# Patient Record
Sex: MALE | Race: WHITE | NOT HISPANIC OR LATINO | Employment: FULL TIME | URBAN - METROPOLITAN AREA
[De-identification: names, ages, dates, MRNs, and addresses within clinical notes are randomized per-mention and may not be internally consistent; named-entity substitution may affect disease eponyms.]

---

## 2018-06-27 ENCOUNTER — HOSPITAL ENCOUNTER (EMERGENCY)
Facility: MEDICAL CENTER | Age: 53
End: 2018-06-28
Attending: EMERGENCY MEDICINE
Payer: COMMERCIAL

## 2018-06-27 DIAGNOSIS — T14.8XXA BRUISE: ICD-10-CM

## 2018-06-27 PROCEDURE — 99283 EMERGENCY DEPT VISIT LOW MDM: CPT

## 2018-06-28 VITALS
SYSTOLIC BLOOD PRESSURE: 115 MMHG | BODY MASS INDEX: 42.65 KG/M2 | HEIGHT: 71 IN | OXYGEN SATURATION: 93 % | HEART RATE: 89 BPM | TEMPERATURE: 98.1 F | RESPIRATION RATE: 18 BRPM | WEIGHT: 304.68 LBS | DIASTOLIC BLOOD PRESSURE: 67 MMHG

## 2018-06-28 ASSESSMENT — PAIN SCALES - GENERAL: PAINLEVEL_OUTOF10: 0

## 2018-06-28 NOTE — ED NOTES
Chief Complaint   Patient presents with   • Bug Bite     pt states he looked at his under arm yesterday morning and noticed a bruise that looks like a previous spider bite he has had

## 2018-06-28 NOTE — ED NOTES
ERP was in to see pt. Pt states he is ready to go home. Patient provided printed discharge instructions which included signs and symptoms to look out for, why to return to ER, and other follow up appointments to make. Patient stated they understand discharge instructions and had no further questions or concerns at this time. Patient discharged to home by self. Patient ambulated out of ER with stable gait.

## 2018-06-28 NOTE — ED PROVIDER NOTES
ED Provider Note    CHIEF COMPLAINT  Chief Complaint   Patient presents with   • Bug Bite     pt states he looked at his under arm yesterday morning and noticed a bruise that looks like a previous spider bite he has had in past years        HPI    Primary care provider: Mikala Saba M.D.   History obtained from: Patient  History limited by: None     Pietro Stauffer is a 52 y.o. male who presents to the ED complaining of possible spider bite to his right upper arm.  Patient states that he noticed an area of swelling on the right upper arm yesterday morning and states that the symptoms appear similar to when he had a spider bite previously.  Patient states that he felt warm yesterday but did not check his temperature.  He also reports that he did not want to come to the ED tonight but his mom made him do it.  Patient states that he otherwise feels fine.  He did not actually see a spider bite him.  He denies rash anywhere else or any other symptoms.  He denies any known injury or trauma.    REVIEW OF SYSTEMS  Please see HPI for pertinent positives/negatives.     PAST MEDICAL HISTORY  Past Medical History:   Diagnosis Date   • Arthritis    • Hypothyroidism     total thyroidectomy   • Psychiatric disorder     depression        SURGICAL HISTORY  Past Surgical History:   Procedure Laterality Date   • BUNIONECTOMY  10/31/2011    Performed by YARY STONE at SURGERY [facility]   • TENDON REPAIR  10/31/2011    Performed by Y[facility] STONE at [facility]   • THYROIDECTOMY TOTAL  2007   • APPENDECTOMY  1998   • OTHER ORTHOPEDIC SURGERY  1985    ganglion cyst excision, bilateral x2 left x1 right        SOCIAL HISTORY  Social History     Social History Main Topics   • Smoking status: Never Smoker   • Smokeless tobacco: Never Used      Comment: chew   • Alcohol use Yes      Comment: 1-2 per day   • Drug use: No   • Sexual activity: Not on file        FAMILY HISTORY  Family History   Problem Relation Age  "of Onset   • Heart Disease     • Hypertension     • Cancer     • Diabetes Father         CURRENT MEDICATIONS  Home Medications     Reviewed by Hue Garrett R.N. (Registered Nurse) on 06/28/18 at 0003  Med List Status: <None>   Medication Last Dose Status   azithromycin (ZITHROMAX) 250 MG TABS  Active   azithromycin (ZITHROMAX) 250 MG TABS  Active   fluticasone (FLONASE) 50 MCG/ACT nasal spray  Active   guaifenesin-codeine (CHERATUSSIN AC) SOLN  Active   Hydrocod Polst-Chlorphen Polst 10-8 MG/5ML LQCR  Active   levothyroxine (SYNTHROID) 125 MCG TABS Taking Active                 ALLERGIES  Allergies   Allergen Reactions   • Nkda [No Known Drug Allergy]         PHYSICAL EXAM  VITAL SIGNS: /67   Pulse 89   Temp 36.7 °C (98.1 °F)   Resp 18   Ht 1.803 m (5' 11\")   Wt (!) 138.2 kg (304 lb 10.8 oz)   SpO2 93%   BMI 42.49 kg/m²  @JANIS[[id number]::@     Pulse ox interpretation: 93% I interpret this pulse ox as normal     Constitutional: Well developed, well nourished, alert in no apparent distress, nontoxic appearance    HENT: No external signs of trauma    Eyes: PERRL, conjunctiva without erythema, no discharge, no icterus     Cardiovascular: Srong distal pulses and good perfusion    Thorax & Lungs: No respiratory distress  Extremities: No cyanosis, no edema, no gross deformity, good ROM, no tenderness, oval shaped area of ecchymosis on posterior portion of right upper arm distally without apparent tenderness to palpation or warmth/crepitus/fluctuance/streaking/drainage/blisters/vesicles/ulceration, intact distal pulses with brisk cap refill    Skin: Warm, dry, no pallor/cyanosis, no rash noted except as above  Lymphatic: No lymphadenopathy noted    Neuro: A/O times 3, no focal deficits noted    Psychiatric: Cooperative            DIAGNOSTIC STUDIES / PROCEDURES        LABS  All labs reviewed by me.     Results for orders placed or performed in visit on 12/22/12   POCT Influenza A/B   Result Value Ref Range    " Rapid Influenza A-B neg         RADIOLOGY  The radiologist's interpretation of all radiological studies have been reviewed by me.     No orders to display          COURSE & MEDICAL DECISION MAKING  Nursing notes, VS, PMSFHx reviewed in chart.       Differential diagnoses considered include but are not limited to: Cellulitis, insect bite/sting, bruise/hematoma       Patient presents to ED with above complaint.  He is noted to have what appears to be a bruise on the right upper arm.  It does not appear to be infected and I do not see any evidence of a bite.  Discussed with patient option of antibiotic for cellulitis which he declined.  I discussed with him home treatment for his bruise and worrisome signs and symptoms and return to ED precautions and he was advised on outpatient follow-up.  He is otherwise noted to be in no acute distress and nontoxic in appearance.  Patient verbalized understanding and agreed with plan of care with no further questions or concerns.      The patient is referred to a primary physician for blood pressure management, diabetic screening, and for all other preventative health concerns.       FINAL IMPRESSION  1. Bruise           DISPOSITION  Patient will be discharged home in stable condition.       FOLLOW UP  Mikala Saba M.D.  [address] [place] [id number] [phone]    Call today      [facility], Emergency Dept  [address]  [place] [address] [phone]    If symptoms worsen         OUTPATIENT MEDICATIONS  New Prescriptions    No medications on file          Electronically signed by: Raul Trevino, 6/27/2018 11:37 PM      Portions of this record were made with voice recognition software.  Despite my review, spelling/grammar/context errors may still remain.  Interpretation of this chart should be taken in this context.

## 2020-12-09 ENCOUNTER — OFFICE VISIT (OUTPATIENT)
Dept: MEDICAL GROUP | Facility: LAB | Age: 55
End: 2020-12-09
Payer: COMMERCIAL

## 2020-12-09 VITALS
DIASTOLIC BLOOD PRESSURE: 104 MMHG | BODY MASS INDEX: 42.66 KG/M2 | SYSTOLIC BLOOD PRESSURE: 152 MMHG | TEMPERATURE: 98.8 F | HEIGHT: 72 IN | WEIGHT: 315 LBS | RESPIRATION RATE: 18 BRPM | HEART RATE: 74 BPM | OXYGEN SATURATION: 95 %

## 2020-12-09 DIAGNOSIS — Z12.11 SCREENING FOR COLORECTAL CANCER: ICD-10-CM

## 2020-12-09 DIAGNOSIS — Z12.12 SCREENING FOR COLORECTAL CANCER: ICD-10-CM

## 2020-12-09 DIAGNOSIS — E03.9 HYPOTHYROIDISM (ACQUIRED): ICD-10-CM

## 2020-12-09 DIAGNOSIS — M25.561 CHRONIC PAIN OF RIGHT KNEE: ICD-10-CM

## 2020-12-09 DIAGNOSIS — I10 ESSENTIAL HYPERTENSION: Primary | ICD-10-CM

## 2020-12-09 DIAGNOSIS — Z12.5 SCREENING PSA (PROSTATE SPECIFIC ANTIGEN): ICD-10-CM

## 2020-12-09 DIAGNOSIS — G89.29 CHRONIC PAIN OF RIGHT KNEE: ICD-10-CM

## 2020-12-09 PROCEDURE — 99203 OFFICE O/P NEW LOW 30 MIN: CPT | Performed by: PHYSICIAN ASSISTANT

## 2020-12-09 RX ORDER — LOSARTAN POTASSIUM 100 MG/1
100 TABLET ORAL DAILY
Qty: 90 TAB | Refills: 0 | Status: SHIPPED | OUTPATIENT
Start: 2020-12-09 | End: 2021-03-02

## 2020-12-09 RX ORDER — LOSARTAN POTASSIUM 100 MG/1
100 TABLET ORAL DAILY
Qty: 90 TAB | Refills: 0 | Status: SHIPPED | OUTPATIENT
Start: 2020-12-09 | End: 2020-12-09 | Stop reason: SDUPTHER

## 2020-12-09 ASSESSMENT — ENCOUNTER SYMPTOMS
NEUROLOGICAL NEGATIVE: 1
GASTROINTESTINAL NEGATIVE: 1
CHILLS: 0
FEVER: 0
EYES NEGATIVE: 1
WEIGHT LOSS: 0
PSYCHIATRIC NEGATIVE: 1
CARDIOVASCULAR NEGATIVE: 1
DIAPHORESIS: 0
SORE THROAT: 0
RESPIRATORY NEGATIVE: 1

## 2020-12-09 ASSESSMENT — PATIENT HEALTH QUESTIONNAIRE - PHQ9: CLINICAL INTERPRETATION OF PHQ2 SCORE: 0

## 2020-12-09 NOTE — ASSESSMENT & PLAN NOTE
New to me; chronic  Is under the car of [employer].   To have right knee scope in near future - though needed PCP and clearance prior to having surgery scheduled.

## 2020-12-09 NOTE — ASSESSMENT & PLAN NOTE
New to me; chronic; history of thyroidectomy.   Using Levothyroxine 125mcg.   Reports increase in weight and fatigue.   Would like levels checked.

## 2020-12-09 NOTE — ASSESSMENT & PLAN NOTE
New to me today.   Pt denies previous history of elevated BP, though he has not had consistent medical care for a while.   No chest pain or sob  Denies headache  No vision changes.     Does consume increased amounts of sodium and caffeine throughout the day.   Consumes about 2-3 beers/day as well.

## 2020-12-09 NOTE — PROGRESS NOTES
Pietro Stauffer is a 55 y.o. male new patient here for chronic care management.   HPI:    Hypothyroidism (acquired)  New to me; chronic; history of thyroidectomy.   Using Levothyroxine 125mcg.   Reports increase in weight and fatigue.   Would like levels checked.       Chronic pain of right knee  New to me; chronic  Is under the car of [employer].   To have right knee scope in near future - though needed PCP and clearance prior to having surgery scheduled.     Essential hypertension  New to me today.   Pt denies previous history of elevated BP, though he has not had consistent medical care for a while.   No chest pain or sob  Denies headache  No vision changes.     Does consume increased amounts of sodium and caffeine throughout the day.   Consumes about 2-3 beers/day as well.     Current medicines (including changes today)  Current Outpatient Medications   Medication Sig Dispense Refill   • Naproxen Sodium (ALEVE PO) Take  by mouth.     • losartan (COZAAR) 100 MG Tab Take 1 Tab by mouth every day. 90 Tab 0   • levothyroxine (SYNTHROID) 125 MCG TABS Take 125 mcg by mouth 2 Times a Day. Take two tablets daily, on Monday uses 3 tablets       No current facility-administered medications for this visit.      He  has a past medical history of Arthritis, Hypothyroidism, and Psychiatric disorder.  He  has a past surgical history that includes other orthopedic surgery (1985); appendectomy (1998); thyroidectomy total (2007); bunionectomy (10/31/2011); and tendon repair (10/31/2011).  Social History     Tobacco Use   • Smoking status: Never Smoker   • Smokeless tobacco: Never Used   • Tobacco comment: chew   Substance Use Topics   • Alcohol use: Yes     Comment: 1-2 per day   • Drug use: No     Social History     Social History Narrative   • Not on file     Family History   Problem Relation Age of Onset   • Heart Disease Other    • Hypertension Other    • Cancer Other    • Diabetes Father      Family Status   Relation Name Status   •  "OTHER  (Not Specified)   • OTHER  (Not Specified)   • OTHER  (Not Specified)   • Fa  (Not Specified)         ROS  Review of Systems   Constitutional: Positive for malaise/fatigue. Negative for chills, diaphoresis, fever and weight loss.   HENT: Negative for congestion, ear pain and sore throat.    Eyes: Negative.    Respiratory: Negative.    Cardiovascular: Negative.    Gastrointestinal: Negative.    Genitourinary: Negative.         +nocturia    Musculoskeletal: Positive for joint pain.   Skin: Negative.    Neurological: Negative.    Endo/Heme/Allergies: Negative for environmental allergies.   Psychiatric/Behavioral: Negative.         Objective:     /104 (BP Location: Left arm, Patient Position: Sitting, BP Cuff Size: Large adult)   Pulse 74   Temp 37.1 °C (98.8 °F) (Temporal)   Resp 18   Ht 1.816 m (5' 11.5\")   Wt (!) 146.1 kg (322 lb)   SpO2 95%  Body mass index is 44.28 kg/m².  Physical Exam:    Constitutional: Alert, no distress.  Skin: Warm, dry, good turgor, no rashes in visible areas, small firm nodule on the top of head to the right side. Non tender in this area.   Eye: Equal, round and reactive, conjunctiva clear, lids normal.  Neck: Trachea midline  Respiratory: Unlabored respiratory effort, lungs clear to auscultation, no wheezes, no ronchi.  Cardiovascular: Normal S1, S2, no murmur, no edema.  Psych: Alert and oriented x3, normal affect and mood.      Assessment and Plan:   The following treatment plan was discussed    1. Chronic pain of right knee  New to me; managed by [employer]  To have surgery.   Will need to have BP better controlled prior to. Did have clearance with [employer], though they are requesting clearance from PCP.     2. Essential hypertension  New to me;   Will start Losartan as written.   Pt was educated on use and SEs of medication.  Will see pt back in 1 week.   Discuss reduction in sodium content in diet, reduce caffeine and alcohol.   UC/ED precautions discussed.   - losartan " (COZAAR) 100 MG Tab; Take 1 Tab by mouth every day.  Dispense: 90 Tab; Refill: 0    3. Hypothyroidism (acquired)  New to me; chronic; due for labs.  - TSH; Future  - FREE THYROXINE; Future  - T3 FREE; Future    4. BMI 40.0-44.9, adult (HCC)  BMI was assessed today and reviewed with patient as meeting criteria for the risk factor obesity.  Willing to change as well as barriers were assessed. A collaborative plan was made with the patient and goals were set. Assistance was discussed, including self-help and more formal medical adjunctive treatments.   Wt Readings from Last 3 Encounters:   12/09/20 (!) 146.1 kg (322 lb)   06/27/18 (!) 138.2 kg (304 lb 10.8 oz)   12/22/12 122.5 kg (270 lb)     - CBC WITH DIFFERENTIAL; Future  - Comp Metabolic Panel; Future  - Lipid Profile; Future  - HEMOGLOBIN A1C; Future  - VITAMIN D,25 HYDROXY; Future    5. Screening PSA (prostate specific antigen)  - PROSTATE SPECIFIC AG DIAGNOSTIC; Future    6. Screening for colorectal cancer  - COLOGUARD (FIT DNA)    *Continue to monitor scalp bump 2/2 hitting head recently.     Records requested.  Followup: Return in about 1 week (around 12/16/2020).       Theresa Lucas P.A.-C.  Supervising MD: Dr. Shakila Flores MD  12/09/20

## 2020-12-09 NOTE — LETTER
[facility]  Theresa Lucas P.A.-C.  [address]  [place] [address]  Fax: [phone]   Authorization for Release/Disclosure of   Protected Health Information   Name: PIETRO GOLDEN : 1965 SSN: [id number]   Address: [address] [place] [address]  [place] [address] Phone:    [phone] (home)    I authorize the entity listed below to release/disclose the PHI below to:   [facility]/Theresa Lucas P.A.-C. and Theresa Lucas P.A.-C.   Provider or Entity Name:  Dr. Ramin Giordano MD/[place]   Address   [facility]  [address] [place], [place]  Phone:      Fax:     Reason for request: continuity of care   Information to be released:    [  ] LAST COLONOSCOPY,  including any PATH REPORT and follow-up  [  ] LAST FIT/COLOGUARD RESULT [  ] LAST DEXA  [  ] LAST MAMMOGRAM  [  ] LAST PAP  [  ] LAST LABS [  ] RETINA EXAM REPORT  [  ] IMMUNIZATION RECORDS  [  ] Release all info      [  ] Check here and initial the line next to each item to release ALL health information INCLUDING  _____ Care and treatment for drug and / or alcohol abuse  _____ HIV testing, infection status, or AIDS  _____ Genetic Testing    DATES OF SERVICE OR TIME PERIOD TO BE DISCLOSED: _____________  I understand and acknowledge that:  * This Authorization may be revoked at any time by you in writing, except if your health information has already been used or disclosed.  * Your health information that will be used or disclosed as a result of you signing this authorization could be re-disclosed by the recipient. If this occurs, your re-disclosed health information may no longer be protected by State or Federal laws.  * You may refuse to sign this Authorization. Your refusal will not affect your ability to obtain treatment.  * This Authorization becomes effective upon signing and will  on (date) __________.      If no date is indicated, this Authorization will  one (1) year from the signature date.    Name: Pietro  Eh    Signature:   Date:     12/9/2020       PLEASE FAX REQUESTED RECORDS BACK TO: [phone]

## 2020-12-09 NOTE — LETTER
[facility]  Theresa Lucas P.A.-C.  [address]  [place] [address]  Fax: [phone]   Authorization for Release/Disclosure of   Protected Health Information   Name: PIETRO STAUFFER : 1965 SSN: [id number]   Address: [address] [place] [address]  [place] [address] Phone:    [phone] (home)    I authorize the entity listed below to release/disclose the PHI below to:   [facility]/Theresa Lucas P.A.-C. and Theresa Lucas P.A.-C.   Provider or Entity Name:     Address   City, State, Zip   Phone:      Fax:     Reason for request: continuity of care   Information to be released:    [  ] LAST COLONOSCOPY,  including any PATH REPORT and follow-up  [  ] LAST FIT/COLOGUARD RESULT [  ] LAST DEXA  [  ] LAST MAMMOGRAM  [  ] LAST PAP  [  ] LAST LABS [  ] RETINA EXAM REPORT  [  ] IMMUNIZATION RECORDS  [  ] Release all info      [  ] Check here and initial the line next to each item to release ALL health information INCLUDING  _____ Care and treatment for drug and / or alcohol abuse  _____ HIV testing, infection status, or AIDS  _____ Genetic Testing    DATES OF SERVICE OR TIME PERIOD TO BE DISCLOSED: _____________  I understand and acknowledge that:  * This Authorization may be revoked at any time by you in writing, except if your health information has already been used or disclosed.  * Your health information that will be used or disclosed as a result of you signing this authorization could be re-disclosed by the recipient. If this occurs, your re-disclosed health information may no longer be protected by State or Federal laws.  * You may refuse to sign this Authorization. Your refusal will not affect your ability to obtain treatment.  * This Authorization becomes effective upon signing and will  on (date) __________.      If no date is indicated, this Authorization will  one (1) year from the signature date.    Name: Pietro Stauffer    Signature:   Date:     2020            PLEASE FAX REQUESTED RECORDS BACK TO: [phone]

## 2020-12-14 ENCOUNTER — HOSPITAL ENCOUNTER (OUTPATIENT)
Dept: LAB | Facility: MEDICAL CENTER | Age: 55
End: 2020-12-14
Attending: PHYSICIAN ASSISTANT
Payer: COMMERCIAL

## 2020-12-14 DIAGNOSIS — Z12.5 SCREENING PSA (PROSTATE SPECIFIC ANTIGEN): ICD-10-CM

## 2020-12-14 DIAGNOSIS — E03.9 HYPOTHYROIDISM (ACQUIRED): ICD-10-CM

## 2020-12-14 LAB
25(OH)D3 SERPL-MCNC: 15 NG/ML (ref 30–100)
ALBUMIN SERPL BCP-MCNC: 4.2 G/DL (ref 3.2–4.9)
ALBUMIN/GLOB SERPL: 1.4 G/DL
ALP SERPL-CCNC: 82 U/L (ref 30–99)
ALT SERPL-CCNC: 71 U/L (ref 2–50)
ANION GAP SERPL CALC-SCNC: 9 MMOL/L (ref 7–16)
AST SERPL-CCNC: 60 U/L (ref 12–45)
BASOPHILS # BLD AUTO: 0.8 % (ref 0–1.8)
BASOPHILS # BLD: 0.05 K/UL (ref 0–0.12)
BILIRUB SERPL-MCNC: 0.9 MG/DL (ref 0.1–1.5)
BUN SERPL-MCNC: 9 MG/DL (ref 8–22)
CALCIUM SERPL-MCNC: 9 MG/DL (ref 8.5–10.5)
CHLORIDE SERPL-SCNC: 101 MMOL/L (ref 96–112)
CHOLEST SERPL-MCNC: 192 MG/DL (ref 100–199)
CO2 SERPL-SCNC: 28 MMOL/L (ref 20–33)
CREAT SERPL-MCNC: 0.69 MG/DL (ref 0.5–1.4)
EOSINOPHIL # BLD AUTO: 0.15 K/UL (ref 0–0.51)
EOSINOPHIL NFR BLD: 2.3 % (ref 0–6.9)
ERYTHROCYTE [DISTWIDTH] IN BLOOD BY AUTOMATED COUNT: 46.7 FL (ref 35.9–50)
EST. AVERAGE GLUCOSE BLD GHB EST-MCNC: 120 MG/DL
FASTING STATUS PATIENT QL REPORTED: NORMAL
GLOBULIN SER CALC-MCNC: 2.9 G/DL (ref 1.9–3.5)
GLUCOSE SERPL-MCNC: 120 MG/DL (ref 65–99)
HBA1C MFR BLD: 5.8 % (ref 0–5.6)
HCT VFR BLD AUTO: 45.9 % (ref 42–52)
HDLC SERPL-MCNC: 54 MG/DL
HGB BLD-MCNC: 15.6 G/DL (ref 14–18)
IMM GRANULOCYTES # BLD AUTO: 0.03 K/UL (ref 0–0.11)
IMM GRANULOCYTES NFR BLD AUTO: 0.5 % (ref 0–0.9)
LDLC SERPL CALC-MCNC: 124 MG/DL
LYMPHOCYTES # BLD AUTO: 1.67 K/UL (ref 1–4.8)
LYMPHOCYTES NFR BLD: 25.3 % (ref 22–41)
MCH RBC QN AUTO: 32.9 PG (ref 27–33)
MCHC RBC AUTO-ENTMCNC: 34 G/DL (ref 33.7–35.3)
MCV RBC AUTO: 96.8 FL (ref 81.4–97.8)
MONOCYTES # BLD AUTO: 0.65 K/UL (ref 0–0.85)
MONOCYTES NFR BLD AUTO: 9.8 % (ref 0–13.4)
NEUTROPHILS # BLD AUTO: 4.05 K/UL (ref 1.82–7.42)
NEUTROPHILS NFR BLD: 61.3 % (ref 44–72)
NRBC # BLD AUTO: 0 K/UL
NRBC BLD-RTO: 0 /100 WBC
PLATELET # BLD AUTO: 176 K/UL (ref 164–446)
PMV BLD AUTO: 8.3 FL (ref 9–12.9)
POTASSIUM SERPL-SCNC: 4.1 MMOL/L (ref 3.6–5.5)
PROT SERPL-MCNC: 7.1 G/DL (ref 6–8.2)
PSA SERPL-MCNC: 0.68 NG/ML (ref 0–4)
RBC # BLD AUTO: 4.74 M/UL (ref 4.7–6.1)
SODIUM SERPL-SCNC: 138 MMOL/L (ref 135–145)
T3FREE SERPL-MCNC: 3.04 PG/ML (ref 2–4.4)
T4 FREE SERPL-MCNC: 1.82 NG/DL (ref 0.93–1.7)
TRIGL SERPL-MCNC: 68 MG/DL (ref 0–149)
TSH SERPL DL<=0.005 MIU/L-ACNC: 2.08 UIU/ML (ref 0.38–5.33)
WBC # BLD AUTO: 6.6 K/UL (ref 4.8–10.8)

## 2020-12-14 PROCEDURE — 80061 LIPID PANEL: CPT

## 2020-12-14 PROCEDURE — 85025 COMPLETE CBC W/AUTO DIFF WBC: CPT

## 2020-12-14 PROCEDURE — 84153 ASSAY OF PSA TOTAL: CPT

## 2020-12-14 PROCEDURE — 84439 ASSAY OF FREE THYROXINE: CPT

## 2020-12-14 PROCEDURE — 82306 VITAMIN D 25 HYDROXY: CPT

## 2020-12-14 PROCEDURE — 84481 FREE ASSAY (FT-3): CPT

## 2020-12-14 PROCEDURE — 84443 ASSAY THYROID STIM HORMONE: CPT

## 2020-12-14 PROCEDURE — 83036 HEMOGLOBIN GLYCOSYLATED A1C: CPT

## 2020-12-14 PROCEDURE — 80053 COMPREHEN METABOLIC PANEL: CPT

## 2020-12-14 PROCEDURE — 36415 COLL VENOUS BLD VENIPUNCTURE: CPT

## 2020-12-16 ENCOUNTER — OFFICE VISIT (OUTPATIENT)
Dept: MEDICAL GROUP | Facility: LAB | Age: 55
End: 2020-12-16
Payer: COMMERCIAL

## 2020-12-16 VITALS
RESPIRATION RATE: 20 BRPM | OXYGEN SATURATION: 94 % | SYSTOLIC BLOOD PRESSURE: 122 MMHG | HEART RATE: 91 BPM | BODY MASS INDEX: 42.66 KG/M2 | DIASTOLIC BLOOD PRESSURE: 78 MMHG | HEIGHT: 72 IN | WEIGHT: 315 LBS | TEMPERATURE: 98.8 F

## 2020-12-16 DIAGNOSIS — M25.561 CHRONIC PAIN OF RIGHT KNEE: ICD-10-CM

## 2020-12-16 DIAGNOSIS — G89.29 CHRONIC PAIN OF RIGHT KNEE: ICD-10-CM

## 2020-12-16 DIAGNOSIS — I10 ESSENTIAL HYPERTENSION: ICD-10-CM

## 2020-12-16 DIAGNOSIS — E55.9 VITAMIN D DEFICIENCY: ICD-10-CM

## 2020-12-16 DIAGNOSIS — E66.01 MORBID OBESITY WITH BMI OF 40.0-44.9, ADULT (HCC): Primary | ICD-10-CM

## 2020-12-16 DIAGNOSIS — R73.03 PREDIABETES: ICD-10-CM

## 2020-12-16 PROCEDURE — 99214 OFFICE O/P EST MOD 30 MIN: CPT | Performed by: PHYSICIAN ASSISTANT

## 2020-12-16 RX ORDER — ERGOCALCIFEROL 1.25 MG/1
50000 CAPSULE ORAL
Qty: 12 CAP | Refills: 0 | Status: SHIPPED | OUTPATIENT
Start: 2020-12-16 | End: 2021-03-09

## 2020-12-16 ASSESSMENT — FIBROSIS 4 INDEX: FIB4 SCORE: 2.18

## 2020-12-16 NOTE — ASSESSMENT & PLAN NOTE
Follow up  Pt was started on Losartan 100mg PO once daily since last visit.   Pt is doing well.   BP is well controlled at this time.   No adverse side effects.

## 2020-12-16 NOTE — ASSESSMENT & PLAN NOTE
New on recent labs.   Lab Results   Component Value Date/Time    HBA1C 5.8 (H) 12/14/2020 11:40 AM      Working on diet and increasing exercise as tolerated.

## 2020-12-16 NOTE — Clinical Note
Can we fax this to Dr. Giordano from [facility].  Thank yoU!  Theresa Lucas P.A.-C.

## 2020-12-16 NOTE — ASSESSMENT & PLAN NOTE
Follow up; pt is currently seeing Dr. Giordano from [facility].   To have right knee scope in near future.   Surgery has not yet been scheduled, TBD.     Pre-op clearance has been completed by [facility].   BP is well controlled at this time with Losartan.     Pt is able to climb 2 flights of stairs (4METS)  Denies chest pain, sob  No personal or family history of complications of anesthesia.

## 2020-12-16 NOTE — PROGRESS NOTES
"Subjective:   Pietro Stauffer is a 55 y.o. male here today for Lab follow up; BP check    Chronic pain of right knee  Follow up; pt is currently seeing Dr. Giordano from [facility].   To have right knee scope in near future.   Surgery has not yet been scheduled, TBD.     Pre-op clearance has been completed by [facility].   BP is well controlled at this time with Losartan.     Pt is able to climb 2 flights of stairs (4METS)  Denies chest pain, sob  No personal or family history of complications of anesthesia.       Essential hypertension  Follow up  Pt was started on Losartan 100mg PO once daily since last visit.   Pt is doing well.   BP is well controlled at this time.   No adverse side effects.     Prediabetes  New on recent labs.   Lab Results   Component Value Date/Time    HBA1C 5.8 (H) 12/14/2020 11:40 AM      Working on diet and increasing exercise as tolerated.          Current medicines (including changes today)  Current Outpatient Medications   Medication Sig Dispense Refill   • ergocalciferol (DRISDOL) 75913 UNIT capsule Take 1 Cap by mouth every 7 days. 12 Cap 0   • Naproxen Sodium (ALEVE PO) Take  by mouth.     • losartan (COZAAR) 100 MG Tab Take 1 Tab by mouth every day. 90 Tab 0   • levothyroxine (SYNTHROID) 125 MCG TABS Take 125 mcg by mouth 2 Times a Day. Take two tablets daily, on Monday uses 3 tablets       No current facility-administered medications for this visit.      He  has a past medical history of Arthritis, Hypertension, Hypothyroidism, and Psychiatric disorder.    ROS No chest pain, no shortness of breath, no abdominal pain       Objective:     /78 (BP Location: Left arm, Patient Position: Sitting, BP Cuff Size: Large adult)   Pulse 91   Temp 37.1 °C (98.8 °F) (Temporal)   Resp 20   Ht 1.816 m (5' 11.5\")   Wt (!) 146.1 kg (322 lb)   SpO2 94%  Body mass index is 44.28 kg/m².   Physical Exam:  Constitutional: Alert, no distress.  Skin: Warm, dry, good turgor, no rashes in visible areas.  Eye: " Equal, round; conjunctiva clear, lids normal.  Neck: Trachea midline  Respiratory: Unlabored respiratory effort, lungs clear to auscultation, no wheezes, no ronchi.  Cardiovascular: Normal S1, S2, no murmur, no edema.  Psych: Alert and oriented x3, normal affect and mood.        Assessment and Plan:   The following treatment plan was discussed    1. Vitamin D deficiency  New on recent labs.   Rx for high dose Vitamin D.   Pt was educated on use and SEs of medication.  Continue to monitor.   - ergocalciferol (DRISDOL) 79068 UNIT capsule; Take 1 Cap by mouth every 7 days.  Dispense: 12 Cap; Refill: 0    2. Morbid obesity with BMI of 40.0-44.9, adult (HCC)  BMI was assessed today and reviewed with patient as meeting criteria for the risk factor obesity.  Willing to change as well as barriers were assessed. A collaborative plan was made with the patient and goals were set. Assistance was discussed, including self-help and more formal medical adjunctive treatments.     Wt Readings from Last 3 Encounters:   12/16/20 (!) 146.1 kg (322 lb)   12/09/20 (!) 146.1 kg (322 lb)   06/27/18 (!) 138.2 kg (304 lb 10.8 oz)     - Patient identified as having weight management issue.  Appropriate orders and counseling given.  - REFERRAL TO [facility] IMPROVEMENT [facility]GRAMS (HIP); Future    3. Chronic pain of right knee  Given that BP is now well controlled and has established with PCP - cleared for surgery.   To schedule right knee arthroscopy soon.   Pre-op work-up has been completed by providers at [facility].   As primary care stand point, pt is cleared for surgery.  Per Revised Cardiac Risk Index: Pt is considered low risk for pulmonary and cardiovascular complications for a low risk procedure.   To take medications as prescribed. To hold NSAIDS/ASA 7 days prior to surgery.   Chronic conditions are well controlled at this time.     4. Essential hypertension   Follow up; chronic  Doing well on current regimen.     5. Prediabetes:  We  advise to reduce sugar/carbohydrate/alcohol, eat more vegetables and lean meats such as fish/chicken/turkey. We also recommend 30 minutes of cardiovascular exercise most days of the week as tolerated.       Patient was seen for 25 minutes face to face of which > 50% of appointment time was spent on counseling and coordination of care regarding the above.    Followup: Return in about 6 months (around 6/16/2021), or if symptoms worsen or fail to improve.       HENRY Bowles.-JORDI.  Supervising MD: Dr. Shakila Flores MD  12/16/20

## 2021-01-15 ENCOUNTER — HOSPITAL ENCOUNTER (OUTPATIENT)
Dept: LAB | Facility: MEDICAL CENTER | Age: 56
End: 2021-01-15
Attending: ANESTHESIOLOGY
Payer: COMMERCIAL

## 2021-01-15 LAB — COVID ORDER STATUS COVID19: NORMAL

## 2021-01-15 PROCEDURE — C9803 HOPD COVID-19 SPEC COLLECT: HCPCS

## 2021-01-15 PROCEDURE — U0003 INFECTIOUS AGENT DETECTION BY NUCLEIC ACID (DNA OR RNA); SEVERE ACUTE RESPIRATORY SYNDROME CORONAVIRUS 2 (SARS-COV-2) (CORONAVIRUS DISEASE [COVID-19]), AMPLIFIED PROBE TECHNIQUE, MAKING USE OF HIGH THROUGHPUT TECHNOLOGIES AS DESCRIBED BY CMS-2020-01-R: HCPCS

## 2021-01-15 PROCEDURE — U0005 INFEC AGEN DETEC AMPLI PROBE: HCPCS

## 2021-01-16 LAB
SARS-COV-2 RNA RESP QL NAA+PROBE: NOTDETECTED
SPECIMEN SOURCE: NORMAL

## 2021-03-02 DIAGNOSIS — I10 ESSENTIAL HYPERTENSION: ICD-10-CM

## 2021-03-02 RX ORDER — LOSARTAN POTASSIUM 100 MG/1
TABLET ORAL
Qty: 90 TABLET | Refills: 0 | Status: SHIPPED | OUTPATIENT
Start: 2021-03-02 | End: 2021-08-05 | Stop reason: SDUPTHER

## 2021-03-02 NOTE — TELEPHONE ENCOUNTER
Received request via: Pharmacy    Was the patient seen in the last year in this department? Yes  12/16/2020  Does the patient have an active prescription (recently filled or refills available) for medication(s) requested? No

## 2021-03-09 ENCOUNTER — OFFICE VISIT (OUTPATIENT)
Dept: URGENT CARE | Facility: CLINIC | Age: 56
End: 2021-03-09
Payer: COMMERCIAL

## 2021-03-09 ENCOUNTER — APPOINTMENT (OUTPATIENT)
Dept: RADIOLOGY | Facility: IMAGING CENTER | Age: 56
End: 2021-03-09
Attending: NURSE PRACTITIONER
Payer: COMMERCIAL

## 2021-03-09 VITALS
TEMPERATURE: 97.6 F | HEIGHT: 72 IN | SYSTOLIC BLOOD PRESSURE: 144 MMHG | OXYGEN SATURATION: 92 % | DIASTOLIC BLOOD PRESSURE: 90 MMHG | BODY MASS INDEX: 42.66 KG/M2 | HEART RATE: 86 BPM | WEIGHT: 315 LBS | RESPIRATION RATE: 20 BRPM

## 2021-03-09 DIAGNOSIS — M79.644 THUMB PAIN, RIGHT: ICD-10-CM
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
## 2021-03-09 DIAGNOSIS — M25.531 RIGHT WRIST PAIN: ICD-10-CM

## 2021-03-09 DIAGNOSIS — S62.114A CLOSED NONDISPLACED FRACTURE OF TRIQUETRUM OF RIGHT WRIST, INITIAL ENCOUNTER: ICD-10-CM

## 2021-03-09 DIAGNOSIS — S63.601A SPRAIN OF RIGHT THUMB, UNSPECIFIED SITE OF DIGIT, INITIAL ENCOUNTER: ICD-10-CM

## 2021-03-09 DIAGNOSIS — S63.501A WRIST SPRAIN, RIGHT, INITIAL ENCOUNTER: ICD-10-CM

## 2021-03-09 PROCEDURE — 99214 OFFICE O/P EST MOD 30 MIN: CPT | Performed by: NURSE PRACTITIONER

## 2021-03-09 PROCEDURE — 73110 X-RAY EXAM OF WRIST: CPT | Mod: TC,FY,RT | Performed by: NURSE PRACTITIONER

## 2021-03-09 PROCEDURE — 73140 X-RAY EXAM OF FINGER(S): CPT | Mod: TC,FY,RT | Performed by: NURSE PRACTITIONER

## 2021-03-09 ASSESSMENT — ENCOUNTER SYMPTOMS
DIZZINESS: 0
FEVER: 0
CHILLS: 0
LOSS OF CONSCIOUSNESS: 0
WEAKNESS: 1
FALLS: 1
PALPITATIONS: 0
BACK PAIN: 0
COUGH: 0
ORTHOPNEA: 0
HEADACHES: 0

## 2021-03-09 ASSESSMENT — PAIN SCALES - GENERAL: PAINLEVEL: 10=SEVERE PAIN

## 2021-03-09 ASSESSMENT — FIBROSIS 4 INDEX: FIB4 SCORE: 2.18

## 2021-03-09 NOTE — PROGRESS NOTES
"Pietro Stauffer is a 55 y.o. male who presents for Wrist Injury (fall, injured right wrist pain/slightly swollen x10 days)      Wrist Injury   Pertinent negatives include no chest pain.   This is a new problem. This is patient is a 55-year-old gentleman who had a fall on right outstretched hand 10 days ago. He states he fell at the gas station when his knee buckled and he stumbled forward. He denies hitting his head and denies any LOC. He states he has had worsening pain of the right wrist/thumb over the past week and a half and has been attempting to use ice and compression to alleviate symptoms; however, this has made little improvement.   Patient states taking 1-2 tabs of ibuprofen for the past 10 days; however, states it isn't helping much. 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 This patient describes the pain as a \"zinger\" with movement of the wrist and when he moves his thumb up/down and side to side.   Review of Systems   Constitutional: Negative for chills, fever and malaise/fatigue.   Respiratory: Negative for cough.    Cardiovascular: Negative for chest pain, palpitations and orthopnea.   Musculoskeletal: Positive for falls (fell 10 days ago) and joint pain (Right wrist/Right thumb). Negative for back pain.   Skin: Negative for rash.   Neurological: Positive for weakness (R wrist/hand). Negative for dizziness, loss of consciousness and headaches.       Allergies   Allergen Reactions   • Nkda [No Known Drug Allergy]      Past Medical History:   Diagnosis Date   • Arthritis    • Hypertension    • Hypothyroidism     total thyroidectomy   • Psychiatric disorder     depression     Past Surgical History:   Procedure Laterality Date   • BUNIONECTOMY  10/31/2011    Performed by YARY STONE at [facility]S   • TENDON REPAIR  10/31/2011    " "Performed by YARY STONE at SURGERY [facility] ORS   • THYROIDECTOMY TOTAL  2007   • APPENDECTOMY  1998   • OTHER ORTHOPEDIC SURGERY  1985    ganglion cyst excision, bilateral x2 left x1 right     Family History   Problem Relation Age of Onset   • Heart Disease Other    • Hypertension Other    • Cancer Other    • Diabetes Father      Social History     Tobacco Use   • Smoking status: Never Smoker   • Smokeless tobacco: Never Used   • Tobacco comment: chew   Substance Use Topics   • Alcohol use: Yes     Comment: 1-2 per day     Patient Active Problem List   Diagnosis   • Chronic pain of right knee   • Hypothyroidism (acquired)   • Essential hypertension   • Morbid obesity with BMI of 40.0-44.9, adult (HCC)   • Prediabetes     Current Outpatient Medications on File Prior to Visit   Medication Sig Dispense Refill   • losartan (COZAAR) 100 MG Tab TAKE 1 TABLET BY MOUTH EVERY DAY 90 tablet 0   • Naproxen Sodium (ALEVE PO) Take  by mouth.     • levothyroxine (SYNTHROID) 125 MCG TABS Take 125 mcg by mouth 2 Times a Day. Take two tablets daily, on Monday uses 3 tablets       No current facility-administered medications on file prior to visit.          Objective:     /90 (BP Location: Right arm, Patient Position: Sitting)   Pulse 86   Temp 36.4 °C (97.6 °F) (Tympanic)   Resp 20   Ht 1.816 m (5' 11.5\")   Wt (!) 151 kg (333 lb 6.4 oz)   SpO2 92%   BMI 45.85 kg/m²     Physical Exam  Vitals and nursing note reviewed.   Constitutional:       Appearance: Normal appearance. He is not ill-appearing.   HENT:      Head: Normocephalic.   Cardiovascular:      Rate and Rhythm: Normal rate and regular rhythm.      Pulses: Normal pulses.      Heart sounds: Normal heart sounds.   Pulmonary:      Effort: Pulmonary effort is normal.      Breath sounds: Normal breath sounds.   Musculoskeletal:      Right wrist: Swelling (mild ), tenderness and bony tenderness present. No snuff box tenderness or crepitus. Decreased range " of motion. Normal pulse.      Left wrist: Normal.      Right hand: Tenderness and bony tenderness present. Decreased strength of finger abduction and wrist extension. Normal sensation. Normal capillary refill. Normal pulse.        Arms:       Cervical back: Normal range of motion and neck supple.      Comments:  strength 4/5 to Right wrist.   + point tenderness triquetrum.    Skin:     General: Skin is warm and dry.   Neurological:      Mental Status: He is alert and oriented to person, place, and time.   Psychiatric:         Mood and Affect: Mood normal.         Behavior: Behavior normal.         Thought Content: Thought content normal.         Assessment /Associated Orders:      1. Right wrist pain  DX-ARTHROGRAM-WRIST RIGHT    DX-WRIST-COMPLETE 3+ RIGHT   2. Fall due to stumbling, initial encounter  DX-FINGER(S) 2+ RIGHT    DX-WRIST-COMPLETE 3+ RIGHT   3. Thumb pain, right  DX-FINGER(S) 2+ RIGHT   4. Closed nondisplaced fracture of triquetrum of right wrist, initial encounter     5. Sprain of right thumb, unspecified site of digit, initial encounter     6. Wrist sprain, right, initial encounter         Medical Decision Making:      VSS at today's acute urgent care visit.   Wrist splint w. Thumb spica splint ordered. Pt to pick it up at DME. RX given . ( no splint available in  today that would fit patient). Wear 23 hours/ day.     PRICE therapy:   P- protect from further injury  R- rest the affected area as much as possible while pain and swelling persist  I- Ice packs - 15 minutes every 2 hours for the first 24 hours, then 4-5 times daily   C- compress either with splint or ace wrap as directed  E-elevate the extremity to help with swelling and pain    OTC  analgesic of choice (acetaminophen or NSAID). Follow manufactures dosing and safety precautions.     FU hand specialist     Results of all diagnostic tests and referral discussed with patient.       Discussed management options (risks,benefits, and  alternatives to treatment). Pt expresses understanding and the treatment plan was agreed upon. Questions were encouraged and answered to pt's satisfaction.   Advised to follow-up with the primary care physician for recheck, reevaluation, and consideration of further management if necessary.   Return to urgent care prn if new or worsening sx or if there is no improvement in condition prn. Red flags discussed and indications to immediately call 911 or present to the Emergency Department.     I personally reviewed prior external notes and test results pertinent to today's visit.  I have independently reviewed and interpreted all diagnostics ordered during this urgent care acute visit.   Time spent evaluating this patient was at least 30 minutes and includes preparing for visit, counseling/education, exam and evaluation, obtaining history, independent interpretation and ordering lab/test/procedures/medication management. This does not include time spent on separately billable procedures/tests.      Please note that this dictation was created using voice recognition software. I have made a reasonable attempt to correct obvious errors, but I expect that there are errors of grammar and possibly content that I did not discover before finalizing the note.    This note was electronically signed by Rita LAWS, Urgent Care

## 2021-03-19 ENCOUNTER — OFFICE VISIT (OUTPATIENT)
Dept: MEDICAL GROUP | Facility: LAB | Age: 56
End: 2021-03-19
Payer: COMMERCIAL

## 2021-03-19 VITALS
TEMPERATURE: 97 F | BODY MASS INDEX: 42.66 KG/M2 | OXYGEN SATURATION: 92 % | HEIGHT: 72 IN | WEIGHT: 315 LBS | HEART RATE: 94 BPM | SYSTOLIC BLOOD PRESSURE: 106 MMHG | DIASTOLIC BLOOD PRESSURE: 68 MMHG

## 2021-03-19 DIAGNOSIS — R53.83 OTHER FATIGUE: ICD-10-CM

## 2021-03-19 DIAGNOSIS — R10.9 ABDOMINAL DISCOMFORT: ICD-10-CM

## 2021-03-19 DIAGNOSIS — R63.5 WEIGHT GAIN: ICD-10-CM

## 2021-03-19 DIAGNOSIS — R73.02 IMPAIRED GLUCOSE TOLERANCE: ICD-10-CM

## 2021-03-19 DIAGNOSIS — G47.30 SLEEP APNEA, UNSPECIFIED TYPE: ICD-10-CM

## 2021-03-19 PROCEDURE — 99214 OFFICE O/P EST MOD 30 MIN: CPT | Performed by: FAMILY MEDICINE

## 2021-03-19 ASSESSMENT — FIBROSIS 4 INDEX
FIB4 SCORE: 2.18
FIB4 SCORE: 2.18

## 2021-03-19 ASSESSMENT — PATIENT HEALTH QUESTIONNAIRE - PHQ9: CLINICAL INTERPRETATION OF PHQ2 SCORE: 0

## 2021-03-19 NOTE — PROGRESS NOTES
Chief Complaint:   Chief Complaint   Patient presents with   • GI Problem     Stomach    • Weight Gain   • Fatigue       HPI: Established patient, new to me  Pietro Stauffer is a 55 y.o. male who presents for follow-up and evaluation of rapid weight gain, fatigue tiredness and abdominal distention.  Discussed the following today:    1. Abdominal discomfort  Reports for the past 1 month or so has been experiencing abdominal discomfort associated with abdominal distention.  Rapid weight gain.  Reports no abdominal pain but there is excessive gas and bloating.  Denies diarrhea or constipation.  Used to be on NSAIDs for treatment of his knee and joint pain after surgery.  Patient said he has been using naproxen as needed for pain.  Denies vomiting blood or blood in stool or change in stool color    2. Other fatigue  Reports very tired and fatigued and lethargic every day.  He said he cannot even get up in the morning because of the tiredness.  And reports a lot of weight gain    3. Weight gain  For the past few weeks he said he has been gaining weight, reports abdominal distention as mentioned above, patient is with history of hypothyroidism on 125 mcg of Synthroid, last thyroid function done in December with abnormal function and mild elevation of free T4.  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    4. Impaired glucose tolerance  History of impaired glucose tolerance, A1c 5.8.  Reports a lot of weight gain recently    5. Sleep apnea, unspecified type  History of sleep apnea, I asked the patient because he said he is lethargic and fatigued with morbid obesity.  He said he has it and he was diagnosed with sleep apnea but he is not using his CPAP machine because he is not comfortable with it.          Past medical history, family history, social history and medications reviewed and updated in the record. Today   Current medications, problem list and  allergies reviewed in [facility] today   Health maintenance topics are reviewed and updated.    Patient Active Problem List    Diagnosis Date Noted   • Morbid obesity with BMI of 40.0-44.9, adult (HCC) 12/16/2020   • Prediabetes 12/16/2020   • Chronic pain of right knee 12/09/2020   • Hypothyroidism (acquired) 12/09/2020   • Essential hypertension 12/09/2020     Family History   Problem Relation Age of Onset   • Heart Disease Other    • Hypertension Other    • Cancer Other    • Diabetes Father      Social History     Socioeconomic History   • Marital status:      Spouse name: Not on file   • Number of children: Not on file   • Years of education: Not on file   • Highest education level: Not on file   Occupational History   • Not on file   Tobacco Use   • Smoking status: Never Smoker   • Smokeless tobacco: Never Used   • Tobacco comment: chew   Substance and Sexual Activity   • Alcohol use: Yes     Comment: 1-2 per day   • Drug use: No   • Sexual activity: Not on file   Other Topics Concern   • Not on file   Social History Narrative   • Not on file     Social Determinants of Health     Financial Resource Strain:    • Difficulty of Paying Living Expenses:    Food Insecurity:    • Worried About Running Out of Food in the Last Year:    • Ran Out of Food in the Last Year:    Transportation Needs:    • Lack of Transportation (Medical):    • Lack of Transportation (Non-Medical):    Physical Activity:    • Days of Exercise per Week:    • Minutes of Exercise per Session:    Stress:    • Feeling of Stress :    Social Connections:    • Frequency of Communication with Friends and Family:    • Frequency of Social Gatherings with Friends and Family:    • Attends Mosque Services:    • Active Member of Clubs or Organizations:    • Attends Club or Organization Meetings:    • Marital Status:    Intimate Partner Violence:    • Fear of Current or Ex-Partner:    • Emotionally Abused:    • Physically Abused:    • Sexually Abused:   "    Current Outpatient Medications   Medication Sig Dispense Refill   • losartan (COZAAR) 100 MG Tab TAKE 1 TABLET BY MOUTH EVERY DAY 90 tablet 0   • levothyroxine (SYNTHROID) 125 MCG TABS Take 125 mcg by mouth 2 Times a Day. Take two tablets daily, on Monday uses 3 tablets       No current facility-administered medications for this visit.           Review Of Systems  As documented in HPI above  PHYSICAL EXAMINATION:    /68 (BP Location: Left arm, Patient Position: Sitting, BP Cuff Size: Adult)   Pulse 94   Temp 36.1 °C (97 °F)   Ht 1.816 m (5' 11.5\")   Wt 104 kg (229 lb)   SpO2 92%   BMI 31.49 kg/m²   Gen.: Well-developed, well-nourished, morbidly obese patient no apparent distress, pleasant and cooperative with the examination  HEENT: Normocephalic/atraumatic, sinuses nontender with palpation, TMs clear, nares patent with pink mucosa and clear rhinorrhea, oropharynx clear  Neck: No JVD or bruits, no adenopathy  Cor: Regular rate and rhythm without murmur gallop or rub  Lungs: Clear to auscultation with equal breath sounds bilaterally. No wheezes, rhonchi.  Abdomen: Distended abdomen, unable to assess organomegaly.  Soft nontender   extremities: No cyanosis, clubbing or edema          ASSESSMENT/Plan:  1. Abdominal discomfort   associated with distention, new concern.  Advised to do blood work and ultrasound of the abdomen for further evaluation, encouraged to avoid the use of NSAIDs, and use Tylenol only for pain control as needed.  No red flags at this time benign exam  LIPASE    US-ABDOMEN COMPLETE SURVEY   2. Other fatigue   new concern, differential diagnosis includes possibly uncontrolled thyroid function, uncontrolled sleep apnea.  Encouraged to start using his CPAP machine, do blood work to check thyroid function.  Increase hydration  TSH    FREE THYROXINE    TRIIDOTHYRONINE    VITAMIN D,25 HYDROXY    CBC WITH DIFFERENTIAL    Comp Metabolic Panel   3. Weight gain   recheck thyroid function, will " need definitely obesity counseling.     TSH    FREE THYROXINE    TRIIDOTHYRONINE   4. Impaired glucose tolerance   recheck A1c to rule out diabetes since he is reporting a lot of tiredness fatigue  HEMOGLOBIN A1C   5. Sleep apnea, unspecified type   possibly uncontrolled patient is reporting a lot of fatigue and tiredness, not using his CPAP.  Encouraged to start his CPAP machine.  Follow-up with sleep studies to adjust setting if he is uncomfortable with it.       Please note that this dictation was created using voice recognition software. I have made every reasonable attempt to correct obvious errors but there may be errors of grammar and content that I may have overlooked prior to finalization of this note.

## 2021-04-14 ENCOUNTER — HOSPITAL ENCOUNTER (OUTPATIENT)
Dept: LAB | Facility: MEDICAL CENTER | Age: 56
End: 2021-04-14
Attending: PHYSICIAN ASSISTANT
Payer: COMMERCIAL

## 2021-04-14 ENCOUNTER — OFFICE VISIT (OUTPATIENT)
Dept: MEDICAL GROUP | Facility: LAB | Age: 56
End: 2021-04-14
Payer: COMMERCIAL

## 2021-04-14 VITALS
HEIGHT: 72 IN | HEART RATE: 82 BPM | SYSTOLIC BLOOD PRESSURE: 128 MMHG | TEMPERATURE: 98 F | RESPIRATION RATE: 18 BRPM | WEIGHT: 315 LBS | DIASTOLIC BLOOD PRESSURE: 86 MMHG | BODY MASS INDEX: 42.66 KG/M2 | OXYGEN SATURATION: 96 %

## 2021-04-14 DIAGNOSIS — R14.0 ABDOMINAL DISTENSION: ICD-10-CM

## 2021-04-14 DIAGNOSIS — R73.02 IMPAIRED GLUCOSE TOLERANCE: ICD-10-CM

## 2021-04-14 DIAGNOSIS — R63.5 WEIGHT GAIN: ICD-10-CM

## 2021-04-14 DIAGNOSIS — R53.83 OTHER FATIGUE: ICD-10-CM

## 2021-04-14 DIAGNOSIS — R10.9 ABDOMINAL DISCOMFORT: ICD-10-CM

## 2021-04-14 LAB
ALBUMIN SERPL BCP-MCNC: 4.1 G/DL (ref 3.2–4.9)
ALBUMIN/GLOB SERPL: 1.2 G/DL
ALP SERPL-CCNC: 71 U/L (ref 30–99)
ALT SERPL-CCNC: 62 U/L (ref 2–50)
ANION GAP SERPL CALC-SCNC: 16 MMOL/L (ref 7–16)
AST SERPL-CCNC: 83 U/L (ref 12–45)
BASOPHILS # BLD AUTO: 0.7 % (ref 0–1.8)
BASOPHILS # BLD: 0.06 K/UL (ref 0–0.12)
BILIRUB SERPL-MCNC: 0.7 MG/DL (ref 0.1–1.5)
BUN SERPL-MCNC: 10 MG/DL (ref 8–22)
CALCIUM SERPL-MCNC: 9.2 MG/DL (ref 8.5–10.5)
CHLORIDE SERPL-SCNC: 101 MMOL/L (ref 96–112)
CO2 SERPL-SCNC: 24 MMOL/L (ref 20–33)
CREAT SERPL-MCNC: 0.71 MG/DL (ref 0.5–1.4)
EOSINOPHIL # BLD AUTO: 0.1 K/UL (ref 0–0.51)
EOSINOPHIL NFR BLD: 1.2 % (ref 0–6.9)
ERYTHROCYTE [DISTWIDTH] IN BLOOD BY AUTOMATED COUNT: 46.5 FL (ref 35.9–50)
EST. AVERAGE GLUCOSE BLD GHB EST-MCNC: 126 MG/DL
GLOBULIN SER CALC-MCNC: 3.3 G/DL (ref 1.9–3.5)
GLUCOSE SERPL-MCNC: 123 MG/DL (ref 65–99)
HBA1C MFR BLD: 6 % (ref 4–5.6)
HCT VFR BLD AUTO: 45.1 % (ref 42–52)
HGB BLD-MCNC: 16 G/DL (ref 14–18)
IMM GRANULOCYTES # BLD AUTO: 0.02 K/UL (ref 0–0.11)
IMM GRANULOCYTES NFR BLD AUTO: 0.2 % (ref 0–0.9)
LIPASE SERPL-CCNC: 46 U/L (ref 11–82)
LYMPHOCYTES # BLD AUTO: 1.7 K/UL (ref 1–4.8)
LYMPHOCYTES NFR BLD: 20.2 % (ref 22–41)
MCH RBC QN AUTO: 33.3 PG (ref 27–33)
MCHC RBC AUTO-ENTMCNC: 35.5 G/DL (ref 33.7–35.3)
MCV RBC AUTO: 94 FL (ref 81.4–97.8)
MONOCYTES # BLD AUTO: 0.72 K/UL (ref 0–0.85)
MONOCYTES NFR BLD AUTO: 8.6 % (ref 0–13.4)
NEUTROPHILS # BLD AUTO: 5.81 K/UL (ref 1.82–7.42)
NEUTROPHILS NFR BLD: 69.1 % (ref 44–72)
NRBC # BLD AUTO: 0 K/UL
NRBC BLD-RTO: 0 /100 WBC
PLATELET # BLD AUTO: 160 K/UL (ref 164–446)
PMV BLD AUTO: 8.3 FL (ref 9–12.9)
POTASSIUM SERPL-SCNC: 3.4 MMOL/L (ref 3.6–5.5)
PROT SERPL-MCNC: 7.4 G/DL (ref 6–8.2)
RBC # BLD AUTO: 4.8 M/UL (ref 4.7–6.1)
SODIUM SERPL-SCNC: 141 MMOL/L (ref 135–145)
WBC # BLD AUTO: 8.4 K/UL (ref 4.8–10.8)

## 2021-04-14 PROCEDURE — 84480 ASSAY TRIIODOTHYRONINE (T3): CPT

## 2021-04-14 PROCEDURE — 99214 OFFICE O/P EST MOD 30 MIN: CPT | Performed by: PHYSICIAN ASSISTANT

## 2021-04-14 PROCEDURE — 36415 COLL VENOUS BLD VENIPUNCTURE: CPT

## 2021-04-14 PROCEDURE — 80053 COMPREHEN METABOLIC PANEL: CPT

## 2021-04-14 PROCEDURE — 83690 ASSAY OF LIPASE: CPT

## 2021-04-14 PROCEDURE — 85025 COMPLETE CBC W/AUTO DIFF WBC: CPT

## 2021-04-14 PROCEDURE — 82306 VITAMIN D 25 HYDROXY: CPT

## 2021-04-14 PROCEDURE — 83036 HEMOGLOBIN GLYCOSYLATED A1C: CPT

## 2021-04-14 PROCEDURE — 84443 ASSAY THYROID STIM HORMONE: CPT

## 2021-04-14 PROCEDURE — 84439 ASSAY OF FREE THYROXINE: CPT

## 2021-04-14 ASSESSMENT — FIBROSIS 4 INDEX: FIB4 SCORE: 2.18

## 2021-04-14 NOTE — ASSESSMENT & PLAN NOTE
New concern; was seen by Dr. Gutierrez mid March for similar concerns, was unable to complete abdominal US and unable to complete labs due to logistical reasons.   Pt continues to c/o of rapid weight gain and a feeling of fullness in his abdomen.   Denies nausea, vomiting  No diarrhea or constipation.   Does have some urinary hesitancy, otherwise denies overt urinary symptoms.   No blood in the stool.     PSA is WNL.     Has been trying to reduce carbs. Unable to exercise due to abdominal concerns.

## 2021-04-14 NOTE — PROGRESS NOTES
"Subjective:   Pietro Stauffer is a 55 y.o. male here today for abdominal distension     Abdominal distension  New concern; was seen by Dr. Gutierrez mid March for similar concerns, was unable to complete abdominal US and unable to complete labs due to logistical reasons.   Pt continues to c/o of rapid weight gain and a feeling of fullness in his abdomen.   Denies nausea, vomiting  No diarrhea or constipation.   Does have some urinary hesitancy, otherwise denies overt urinary symptoms.   No blood in the stool.     PSA is WNL.     Has been trying to reduce carbs. Unable to exercise due to abdominal concerns.          Current medicines (including changes today)  Current Outpatient Medications   Medication Sig Dispense Refill   • losartan (COZAAR) 100 MG Tab TAKE 1 TABLET BY MOUTH EVERY DAY 90 tablet 0   • levothyroxine (SYNTHROID) 125 MCG TABS Take 125 mcg by mouth 2 Times a Day. Take two tablets daily, on Monday uses 3 tablets       No current facility-administered medications for this visit.     He  has a past medical history of Arthritis, Hypertension, Hypothyroidism, and Psychiatric disorder.    ROS   No chest pain, no shortness of breath, +abdominal distension/firmness       Objective:     /86 (BP Location: Left arm, Patient Position: Sitting, BP Cuff Size: Large adult)   Pulse 82   Temp 36.7 °C (98 °F) (Temporal)   Resp 18   Ht 1.816 m (5' 11.5\")   Wt (!) 152 kg (335 lb 12.8 oz)   SpO2 96%  Body mass index is 46.18 kg/m².   Physical Exam:  Constitutional: Alert, no distress.  Skin: Warm, dry, good turgor, no rashes in visible areas.  Eye: Equal, round; conjunctiva clear, lids normal.  Respiratory: Unlabored respiratory effort, lungs clear to auscultation, no wheezes, no ronchi.  Cardiovascular: Normal S1, S2, no murmur, +1 pitting edema, b/l  Abdomen: firm abdomen, more notable in lower abdomen.  non-tender. Unable to assess HSM.   Psych: Alert and oriented x3, normal affect and mood.      Assessment and " Plan:   The following treatment plan was discussed    1. Abdominal distension  New concern to me.   Pt has been gaining weight quite rapidly.   Pt to complete labs today to reassess thyroid, however I would like pt to complete urgent CT of abdomen and pelvis to r/o mass vs. Bladder obstruction, especially as he seems to be retaining water as well.   Pt agrees with the plan.   ED precautions if he develops any acute abdominal pain or new urinary symptoms.   Will follow up with him regarding labs and imaging once reviewed.   - CT-ABDOMEN-PELVIS WITH & W/O; Future      Followup: Return in about 1 week (around 4/21/2021), or if symptoms worsen or fail to improve.       Theresa Lucas P.A.-C.  Supervising MD: Dr. Salo Montana MD  04/14/21

## 2021-04-15 LAB
T3 SERPL-MCNC: 77.8 NG/DL (ref 60–181)
T4 FREE SERPL-MCNC: 0.84 NG/DL (ref 0.93–1.7)
TSH SERPL DL<=0.005 MIU/L-ACNC: 8.49 UIU/ML (ref 0.38–5.33)

## 2021-04-16 LAB — 25(OH)D3 SERPL-MCNC: 22 NG/ML (ref 30–80)

## 2021-04-19 ENCOUNTER — TELEPHONE (OUTPATIENT)
Dept: MEDICAL GROUP | Facility: LAB | Age: 56
End: 2021-04-19

## 2021-04-19 DIAGNOSIS — E03.9 HYPOTHYROIDISM (ACQUIRED): ICD-10-CM

## 2021-04-19 RX ORDER — LEVOTHYROXINE SODIUM 0.15 MG/1
150 TABLET ORAL
Qty: 90 TABLET | Refills: 1 | Status: SHIPPED | OUTPATIENT
Start: 2021-04-19 | End: 2021-08-06

## 2021-04-19 NOTE — TELEPHONE ENCOUNTER
----- Message from Kenyatta Gutierrez M.D. sent at 4/16/2021 12:46 PM PDT -----  Notify patient please that his blood work results were reviewed.  It shows the following:     Evidence of lazy thyroid/hypothyroidism.  His TSH is elevated and free thyroxine is on the low side.  I see from his medication list he is on 125 mcg of Synthroid daily.  Please confirm with the patient if he is taking this medication every day on empty stomach it means that he needs this dose to be increased to 150 mcg daily.  And recheck his thyroid function again in 6 weeks with a new dose.  If the patient is not taking 125 mcg then he needs to start taking it and recheck the level in 6 weeks.  Patient weight gain and fatigability is probably related to the state of hypothyroidism.    Also his blood sugar is elevated, A1c at 6.0 putting him at prediabetes state.  He needs to watch the diet calories carbohydrates.  And avoid sugar.  And follow-up with PCP for further discussion and consultation.    Liver enzymes are elevated, patient will need further evaluation of liver by doing an ultrasound or CAT scan.  To rule out parenchymal liver disease.  Patient to avoid all hepatotoxic medications like Tylenol and to avoid alcohol.  And follow-up with PCP for further management.

## 2021-04-19 NOTE — TELEPHONE ENCOUNTER
Patient called back and I informed him of results:    He confirmed that he is taking 125mcg levothyroxine every morning on an empty stomach-- advised pt that provider wants to increase dose to 150mcg (please advise if this will be ordered and I will reach out to patient).     Patient is scheduled for his CT scan-- awaiting results.     He was advised not to take ibuprofen by Dr. Gutierrez due to stomach issues, and is now advised not to take Tylenol due to elevated liver enzymes. He is needing a pain medication daily due to back/wrist pain. Please advise on what patient can safely take OTC?    Thank you, I will reach back out to patient.  -NOLVIA Kraft

## 2021-04-21 ENCOUNTER — HOSPITAL ENCOUNTER (OUTPATIENT)
Dept: RADIOLOGY | Facility: MEDICAL CENTER | Age: 56
End: 2021-04-21
Attending: PHYSICIAN ASSISTANT
Payer: COMMERCIAL

## 2021-04-21 DIAGNOSIS — R14.0 ABDOMINAL DISTENSION: ICD-10-CM

## 2021-04-21 PROCEDURE — 700117 HCHG RX CONTRAST REV CODE 255: Performed by: PHYSICIAN ASSISTANT

## 2021-04-21 PROCEDURE — 74177 CT ABD & PELVIS W/CONTRAST: CPT

## 2021-04-21 RX ADMIN — IOHEXOL 25 ML: 240 INJECTION, SOLUTION INTRATHECAL; INTRAVASCULAR; INTRAVENOUS; ORAL at 08:19

## 2021-04-21 RX ADMIN — IOHEXOL 100 ML: 350 INJECTION, SOLUTION INTRAVENOUS at 08:19

## 2021-04-22 DIAGNOSIS — R06.02 SOB (SHORTNESS OF BREATH): ICD-10-CM

## 2021-04-22 NOTE — PROGRESS NOTES
Spoke to the patient this afternoon. Discussed CT abdomen with him. No specific findings regarding why he would be having the increased bloating, edema and worsening lethargy and sob.   Concern now for cardiac origin.     Will order BNP and Echo to evaluation heart function.   Pt was also encouraged to follow up with abdominal US as per Radiology to evaluate the liver lesion.     Answered pt's questions and he is agreeable to the plan.      Theresa Lucas P.A.-C.

## 2021-04-26 ENCOUNTER — TELEPHONE (OUTPATIENT)
Dept: MEDICAL GROUP | Facility: LAB | Age: 56
End: 2021-04-26

## 2021-04-26 NOTE — TELEPHONE ENCOUNTER
Pt called and left  for me to call back.     Called patient back today - 2:23pm   Pt reports that he continues to have shortness of breath and weight gain.     CT abdomen was showed fatty liver, for which he is going to have a follow up abdominal US scheduled for tomorrow.   Encouraged him to get the BNP lab completed tomorrow too.     Echo is scheduled for 06/2021!! Pending lab and abdominal US - may need to reorder this as urgent.     Strict ED precautions discussed again given worsening sob.   Pt expressed a verbal understanding.     Theresa Lucas P.A.-C.

## 2021-04-27 ENCOUNTER — APPOINTMENT (OUTPATIENT)
Dept: RADIOLOGY | Facility: MEDICAL CENTER | Age: 56
End: 2021-04-27
Attending: FAMILY MEDICINE
Payer: COMMERCIAL

## 2021-04-28 ENCOUNTER — APPOINTMENT (OUTPATIENT)
Dept: RADIOLOGY | Facility: MEDICAL CENTER | Age: 56
End: 2021-04-28
Attending: FAMILY MEDICINE
Payer: COMMERCIAL

## 2021-05-02 ENCOUNTER — HOSPITAL ENCOUNTER (OUTPATIENT)
Dept: RADIOLOGY | Facility: MEDICAL CENTER | Age: 56
End: 2021-05-02
Attending: PHYSICIAN ASSISTANT
Payer: COMMERCIAL

## 2021-05-02 DIAGNOSIS — R10.9 ABDOMINAL DISCOMFORT: ICD-10-CM

## 2021-05-02 PROCEDURE — 76705 ECHO EXAM OF ABDOMEN: CPT

## 2021-05-03 ENCOUNTER — HOSPITAL ENCOUNTER (INPATIENT)
Facility: MEDICAL CENTER | Age: 56
LOS: 2 days | DRG: 189 | End: 2021-05-06
Attending: EMERGENCY MEDICINE | Admitting: INTERNAL MEDICINE
Payer: COMMERCIAL

## 2021-05-03 ENCOUNTER — APPOINTMENT (OUTPATIENT)
Dept: RADIOLOGY | Facility: MEDICAL CENTER | Age: 56
DRG: 189 | End: 2021-05-03
Attending: EMERGENCY MEDICINE
Payer: COMMERCIAL

## 2021-05-03 ENCOUNTER — TELEPHONE (OUTPATIENT)
Dept: MEDICAL GROUP | Facility: LAB | Age: 56
End: 2021-05-03

## 2021-05-03 DIAGNOSIS — R60.1 ANASARCA: ICD-10-CM

## 2021-05-03 DIAGNOSIS — E66.01 MORBID OBESITY WITH BMI OF 40.0-44.9, ADULT (HCC): ICD-10-CM

## 2021-05-03 DIAGNOSIS — J96.01 ACUTE HYPOXEMIC RESPIRATORY FAILURE (HCC): ICD-10-CM

## 2021-05-03 DIAGNOSIS — R60.0 BILATERAL LOWER EXTREMITY EDEMA: ICD-10-CM

## 2021-05-03 DIAGNOSIS — G47.33 OBSTRUCTIVE SLEEP APNEA: ICD-10-CM

## 2021-05-03 DIAGNOSIS — R06.00 DYSPNEA, UNSPECIFIED TYPE: ICD-10-CM

## 2021-05-03 PROBLEM — R73.9 HYPERGLYCEMIA: Status: ACTIVE | Noted: 2021-05-03

## 2021-05-03 PROBLEM — R74.8 ELEVATED LIVER ENZYMES: Status: ACTIVE | Noted: 2021-05-03

## 2021-05-03 LAB
ALBUMIN SERPL BCP-MCNC: 3.9 G/DL (ref 3.2–4.9)
ALBUMIN/GLOB SERPL: 1.2 G/DL
ALP SERPL-CCNC: 82 U/L (ref 30–99)
ALT SERPL-CCNC: 71 U/L (ref 2–50)
ANION GAP SERPL CALC-SCNC: 13 MMOL/L (ref 7–16)
AST SERPL-CCNC: 66 U/L (ref 12–45)
BASOPHILS # BLD AUTO: 0.6 % (ref 0–1.8)
BASOPHILS # BLD: 0.05 K/UL (ref 0–0.12)
BILIRUB SERPL-MCNC: 0.7 MG/DL (ref 0.1–1.5)
BUN SERPL-MCNC: 10 MG/DL (ref 8–22)
CALCIUM SERPL-MCNC: 9.2 MG/DL (ref 8.4–10.2)
CHLORIDE SERPL-SCNC: 104 MMOL/L (ref 96–112)
CO2 SERPL-SCNC: 24 MMOL/L (ref 20–33)
CREAT SERPL-MCNC: 0.66 MG/DL (ref 0.5–1.4)
EKG IMPRESSION: NORMAL
EOSINOPHIL # BLD AUTO: 0.1 K/UL (ref 0–0.51)
EOSINOPHIL NFR BLD: 1.3 % (ref 0–6.9)
ERYTHROCYTE [DISTWIDTH] IN BLOOD BY AUTOMATED COUNT: 47.2 FL (ref 35.9–50)
FLUAV RNA SPEC QL NAA+PROBE: NEGATIVE
FLUBV RNA SPEC QL NAA+PROBE: NEGATIVE
GLOBULIN SER CALC-MCNC: 3.2 G/DL (ref 1.9–3.5)
GLUCOSE SERPL-MCNC: 117 MG/DL (ref 65–99)
HCT VFR BLD AUTO: 46.3 % (ref 42–52)
HGB BLD-MCNC: 16 G/DL (ref 14–18)
IMM GRANULOCYTES # BLD AUTO: 0.03 K/UL (ref 0–0.11)
IMM GRANULOCYTES NFR BLD AUTO: 0.4 % (ref 0–0.9)
LYMPHOCYTES # BLD AUTO: 1.81 K/UL (ref 1–4.8)
LYMPHOCYTES NFR BLD: 23.5 % (ref 22–41)
MCH RBC QN AUTO: 33.4 PG (ref 27–33)
MCHC RBC AUTO-ENTMCNC: 34.6 G/DL (ref 33.7–35.3)
MCV RBC AUTO: 96.7 FL (ref 81.4–97.8)
MONOCYTES # BLD AUTO: 0.63 K/UL (ref 0–0.85)
MONOCYTES NFR BLD AUTO: 8.2 % (ref 0–13.4)
NEUTROPHILS # BLD AUTO: 5.08 K/UL (ref 1.82–7.42)
NEUTROPHILS NFR BLD: 66 % (ref 44–72)
NRBC # BLD AUTO: 0 K/UL
NRBC BLD-RTO: 0 /100 WBC
NT-PROBNP SERPL IA-MCNC: 128 PG/ML (ref 0–125)
PLATELET # BLD AUTO: 184 K/UL (ref 164–446)
PMV BLD AUTO: 8.1 FL (ref 9–12.9)
POTASSIUM SERPL-SCNC: 4 MMOL/L (ref 3.6–5.5)
PROT SERPL-MCNC: 7.1 G/DL (ref 6–8.2)
RBC # BLD AUTO: 4.79 M/UL (ref 4.7–6.1)
RSV RNA SPEC QL NAA+PROBE: NEGATIVE
SARS-COV-2 RNA RESP QL NAA+PROBE: NOTDETECTED
SODIUM SERPL-SCNC: 141 MMOL/L (ref 135–145)
SPECIMEN SOURCE: NORMAL
TROPONIN T SERPL-MCNC: <6 NG/L (ref 6–19)
WBC # BLD AUTO: 7.7 K/UL (ref 4.8–10.8)

## 2021-05-03 PROCEDURE — 85025 COMPLETE CBC W/AUTO DIFF WBC: CPT

## 2021-05-03 PROCEDURE — 84484 ASSAY OF TROPONIN QUANT: CPT

## 2021-05-03 PROCEDURE — 71045 X-RAY EXAM CHEST 1 VIEW: CPT

## 2021-05-03 PROCEDURE — G0378 HOSPITAL OBSERVATION PER HR: HCPCS

## 2021-05-03 PROCEDURE — A9270 NON-COVERED ITEM OR SERVICE: HCPCS | Performed by: HOSPITALIST

## 2021-05-03 PROCEDURE — 700111 HCHG RX REV CODE 636 W/ 250 OVERRIDE (IP): Performed by: HOSPITALIST

## 2021-05-03 PROCEDURE — 99220 PR INITIAL OBSERVATION CARE,LEVL III: CPT | Performed by: HOSPITALIST

## 2021-05-03 PROCEDURE — 83880 ASSAY OF NATRIURETIC PEPTIDE: CPT

## 2021-05-03 PROCEDURE — 93005 ELECTROCARDIOGRAM TRACING: CPT | Performed by: EMERGENCY MEDICINE

## 2021-05-03 PROCEDURE — 80053 COMPREHEN METABOLIC PANEL: CPT

## 2021-05-03 PROCEDURE — 700102 HCHG RX REV CODE 250 W/ 637 OVERRIDE(OP): Performed by: HOSPITALIST

## 2021-05-03 PROCEDURE — C9803 HOPD COVID-19 SPEC COLLECT: HCPCS | Performed by: EMERGENCY MEDICINE

## 2021-05-03 PROCEDURE — 0241U HCHG SARS-COV-2 COVID-19 NFCT DS RESP RNA 4 TRGT MIC: CPT

## 2021-05-03 PROCEDURE — 93005 ELECTROCARDIOGRAM TRACING: CPT

## 2021-05-03 PROCEDURE — 96374 THER/PROPH/DIAG INJ IV PUSH: CPT

## 2021-05-03 PROCEDURE — 99285 EMERGENCY DEPT VISIT HI MDM: CPT

## 2021-05-03 RX ORDER — AMOXICILLIN 250 MG
2 CAPSULE ORAL 2 TIMES DAILY
Status: DISCONTINUED | OUTPATIENT
Start: 2021-05-04 | End: 2021-05-06 | Stop reason: HOSPADM

## 2021-05-03 RX ORDER — FUROSEMIDE 10 MG/ML
20 INJECTION INTRAMUSCULAR; INTRAVENOUS
Status: DISCONTINUED | OUTPATIENT
Start: 2021-05-03 | End: 2021-05-04

## 2021-05-03 RX ORDER — ONDANSETRON 2 MG/ML
4 INJECTION INTRAMUSCULAR; INTRAVENOUS EVERY 4 HOURS PRN
Status: DISCONTINUED | OUTPATIENT
Start: 2021-05-03 | End: 2021-05-06 | Stop reason: HOSPADM

## 2021-05-03 RX ORDER — LEVOTHYROXINE SODIUM 0.07 MG/1
150 TABLET ORAL
Status: DISCONTINUED | OUTPATIENT
Start: 2021-05-04 | End: 2021-05-06 | Stop reason: HOSPADM

## 2021-05-03 RX ORDER — POLYETHYLENE GLYCOL 3350 17 G/17G
1 POWDER, FOR SOLUTION ORAL
Status: DISCONTINUED | OUTPATIENT
Start: 2021-05-03 | End: 2021-05-06 | Stop reason: HOSPADM

## 2021-05-03 RX ORDER — OXYCODONE HYDROCHLORIDE 5 MG/1
5 TABLET ORAL
Status: DISCONTINUED | OUTPATIENT
Start: 2021-05-03 | End: 2021-05-03

## 2021-05-03 RX ORDER — ONDANSETRON 4 MG/1
4 TABLET, ORALLY DISINTEGRATING ORAL EVERY 4 HOURS PRN
Status: DISCONTINUED | OUTPATIENT
Start: 2021-05-03 | End: 2021-05-06 | Stop reason: HOSPADM

## 2021-05-03 RX ORDER — POTASSIUM CHLORIDE 20 MEQ/1
20 TABLET, EXTENDED RELEASE ORAL EVERY EVENING
Status: DISCONTINUED | OUTPATIENT
Start: 2021-05-03 | End: 2021-05-06 | Stop reason: HOSPADM

## 2021-05-03 RX ORDER — IBUPROFEN 200 MG
400 TABLET ORAL 2 TIMES DAILY PRN
COMMUNITY

## 2021-05-03 RX ORDER — POTASSIUM CHLORIDE 20 MEQ/1
20 TABLET, EXTENDED RELEASE ORAL DAILY
Status: DISCONTINUED | OUTPATIENT
Start: 2021-05-04 | End: 2021-05-03

## 2021-05-03 RX ORDER — PROMETHAZINE HYDROCHLORIDE 25 MG/1
12.5-25 SUPPOSITORY RECTAL EVERY 4 HOURS PRN
Status: DISCONTINUED | OUTPATIENT
Start: 2021-05-03 | End: 2021-05-03

## 2021-05-03 RX ORDER — BISACODYL 10 MG
10 SUPPOSITORY, RECTAL RECTAL
Status: DISCONTINUED | OUTPATIENT
Start: 2021-05-03 | End: 2021-05-06 | Stop reason: HOSPADM

## 2021-05-03 RX ORDER — HYDROMORPHONE HYDROCHLORIDE 1 MG/ML
0.25 INJECTION, SOLUTION INTRAMUSCULAR; INTRAVENOUS; SUBCUTANEOUS
Status: DISCONTINUED | OUTPATIENT
Start: 2021-05-03 | End: 2021-05-03

## 2021-05-03 RX ORDER — PROMETHAZINE HYDROCHLORIDE 25 MG/1
12.5-25 TABLET ORAL EVERY 4 HOURS PRN
Status: DISCONTINUED | OUTPATIENT
Start: 2021-05-03 | End: 2021-05-03

## 2021-05-03 RX ORDER — PROCHLORPERAZINE EDISYLATE 5 MG/ML
5-10 INJECTION INTRAMUSCULAR; INTRAVENOUS EVERY 4 HOURS PRN
Status: DISCONTINUED | OUTPATIENT
Start: 2021-05-03 | End: 2021-05-03

## 2021-05-03 RX ORDER — LOSARTAN POTASSIUM 25 MG/1
100 TABLET ORAL
Status: DISCONTINUED | OUTPATIENT
Start: 2021-05-04 | End: 2021-05-06 | Stop reason: HOSPADM

## 2021-05-03 RX ORDER — OXYCODONE HYDROCHLORIDE 5 MG/1
2.5 TABLET ORAL
Status: DISCONTINUED | OUTPATIENT
Start: 2021-05-03 | End: 2021-05-03

## 2021-05-03 RX ORDER — ACETAMINOPHEN 325 MG/1
650 TABLET ORAL EVERY 6 HOURS PRN
Status: DISCONTINUED | OUTPATIENT
Start: 2021-05-03 | End: 2021-05-06 | Stop reason: HOSPADM

## 2021-05-03 RX ORDER — ACETAMINOPHEN 500 MG
1000 TABLET ORAL 2 TIMES DAILY PRN
COMMUNITY

## 2021-05-03 RX ADMIN — POTASSIUM CHLORIDE 20 MEQ: 1500 TABLET, EXTENDED RELEASE ORAL at 20:40

## 2021-05-03 RX ADMIN — FUROSEMIDE 20 MG: 10 INJECTION, SOLUTION INTRAMUSCULAR; INTRAVENOUS at 21:49

## 2021-05-03 ASSESSMENT — ENCOUNTER SYMPTOMS
EYE REDNESS: 0
SHORTNESS OF BREATH: 1
VOMITING: 0
SPEECH CHANGE: 0
FEVER: 0
HALLUCINATIONS: 0
SEIZURES: 0
PALPITATIONS: 0
CHILLS: 0
SORE THROAT: 0
EYE DISCHARGE: 0
STRIDOR: 0
BRUISES/BLEEDS EASILY: 0
LOSS OF CONSCIOUSNESS: 0
MYALGIAS: 0
BLOOD IN STOOL: 0
DIARRHEA: 0
FOCAL WEAKNESS: 0
FLANK PAIN: 0
PND: 0
EYE PAIN: 0
ORTHOPNEA: 0
NERVOUS/ANXIOUS: 0
ABDOMINAL PAIN: 0

## 2021-05-03 ASSESSMENT — LIFESTYLE VARIABLES
EVER FELT BAD OR GUILTY ABOUT YOUR DRINKING: NO
TOTAL SCORE: 0
TOTAL SCORE: 0
CONSUMPTION TOTAL: NEGATIVE
HAVE PEOPLE ANNOYED YOU BY CRITICIZING YOUR DRINKING: NO
EVER HAD A DRINK FIRST THING IN THE MORNING TO STEADY YOUR NERVES TO GET RID OF A HANGOVER: NO
AVERAGE NUMBER OF DAYS PER WEEK YOU HAVE A DRINK CONTAINING ALCOHOL: 0
TOTAL SCORE: 0
ALCOHOL_USE: YES
ON A TYPICAL DAY WHEN YOU DRINK ALCOHOL HOW MANY DRINKS DO YOU HAVE: 0
HAVE YOU EVER FELT YOU SHOULD CUT DOWN ON YOUR DRINKING: NO
HOW MANY TIMES IN THE PAST YEAR HAVE YOU HAD 5 OR MORE DRINKS IN A DAY: 0

## 2021-05-03 ASSESSMENT — PATIENT HEALTH QUESTIONNAIRE - PHQ9
8. MOVING OR SPEAKING SO SLOWLY THAT OTHER PEOPLE COULD HAVE NOTICED. OR THE OPPOSITE, BEING SO FIGETY OR RESTLESS THAT YOU HAVE BEEN MOVING AROUND A LOT MORE THAN USUAL: NOT AT ALL
3. TROUBLE FALLING OR STAYING ASLEEP OR SLEEPING TOO MUCH: MORE THAN HALF THE DAYS
7. TROUBLE CONCENTRATING ON THINGS, SUCH AS READING THE NEWSPAPER OR WATCHING TELEVISION: NOT AT ALL
SUM OF ALL RESPONSES TO PHQ QUESTIONS 1-9: 8
9. THOUGHTS THAT YOU WOULD BE BETTER OFF DEAD, OR OF HURTING YOURSELF: NOT AT ALL
6. FEELING BAD ABOUT YOURSELF - OR THAT YOU ARE A FAILURE OR HAVE LET YOURSELF OR YOUR FAMILY DOWN: SEVERAL DAYS
1. LITTLE INTEREST OR PLEASURE IN DOING THINGS: NOT AT ALL
4. FEELING TIRED OR HAVING LITTLE ENERGY: MORE THAN HALF THE DAYS
SUM OF ALL RESPONSES TO PHQ9 QUESTIONS 1 AND 2: 1
2. FEELING DOWN, DEPRESSED, IRRITABLE, OR HOPELESS: SEVERAL DAYS
5. POOR APPETITE OR OVEREATING: MORE THAN HALF THE DAYS

## 2021-05-03 ASSESSMENT — COGNITIVE AND FUNCTIONAL STATUS - GENERAL
DAILY ACTIVITIY SCORE: 24
SUGGESTED CMS G CODE MODIFIER MOBILITY: CH
SUGGESTED CMS G CODE MODIFIER DAILY ACTIVITY: CH
MOBILITY SCORE: 24

## 2021-05-03 ASSESSMENT — FIBROSIS 4 INDEX
FIB4 SCORE: 2.3
FIB4 SCORE: 3.56

## 2021-05-03 NOTE — LETTER
May 3, 2021    To Whom It May Concern:         Please excuse Pietro from work starting today, 05/03/2021, until 05/16/2021 due to ongoing medical conditions. He may return to work on 05/17/2021.          If you have any questions please do not hesitate to call me at the phone number listed below.    Sincerely,          Theresa Lucas PJULITO.-C.  [phone]

## 2021-05-03 NOTE — ED TRIAGE NOTES
"Chief Complaint   Patient presents with   • Leg Swelling     BLE x 1.5 weeks   • Shortness of Breath     x 1.5 weeks   • Chest Pressure     /84   Pulse 91   Temp 36.7 °C (98.1 °F) (Temporal)   Resp 20   Ht 1.803 m (5' 11\")   Wt (!) 153 kg (336 lb 13.8 oz)   SpO2 89%   BMI 46.98 kg/m²     Pt ambulated to ED by self for c/o BLE swelling, shortness of breath and CP x 1.5 weeks.  Pt reports sent to ED by PCP for possible \"echo.\"    Pt initially 89% on RA, placed on 2L oxygen via NC for SPo2 93% before being takend for EKG.    Pt has not received the Covid Vaccines.      "

## 2021-05-03 NOTE — ED NOTES
Med rec completed per Pt at bedside.  Allergies reviewed with Pt. No known drug allergies.  No oral antibiotics in last 14 days.  Pt's pharmacy: [facility] on [address]

## 2021-05-03 NOTE — ED NOTES
Pt amb to ER 5 from [facility] and assessed. Pt presents to ED with CC of SOB with BLE swelling X approx 2 weeks. Pt denies hx of heart failure, reports he is normally an active individual.  Pt had recent outpatient CT and US, was advised to come the ED by his PCP for an ECHO. Pt desatted to 88% on RA, pt placed on 1L O2 via nasal cannula, now maintaining sats in the 90's.   PIV established. 20g to R hand. Pt tolerated well. Blood drawn and sent to lab. PT updated on POC, all needs currently met, call light within reach, will continue to monitor.

## 2021-05-04 ENCOUNTER — APPOINTMENT (OUTPATIENT)
Dept: CARDIOLOGY | Facility: MEDICAL CENTER | Age: 56
DRG: 189 | End: 2021-05-04
Attending: HOSPITALIST
Payer: COMMERCIAL

## 2021-05-04 LAB
ALBUMIN SERPL BCP-MCNC: 3.8 G/DL (ref 3.2–4.9)
ALBUMIN/GLOB SERPL: 1.1 G/DL
ALP SERPL-CCNC: 83 U/L (ref 30–99)
ALT SERPL-CCNC: 73 U/L (ref 2–50)
ANION GAP SERPL CALC-SCNC: 11 MMOL/L (ref 7–16)
AST SERPL-CCNC: 73 U/L (ref 12–45)
BASOPHILS # BLD AUTO: 0.6 % (ref 0–1.8)
BASOPHILS # BLD: 0.04 K/UL (ref 0–0.12)
BILIRUB SERPL-MCNC: 0.7 MG/DL (ref 0.1–1.5)
BUN SERPL-MCNC: 14 MG/DL (ref 8–22)
CALCIUM SERPL-MCNC: 9.1 MG/DL (ref 8.4–10.2)
CHLORIDE SERPL-SCNC: 100 MMOL/L (ref 96–112)
CHOLEST SERPL-MCNC: 175 MG/DL (ref 100–199)
CO2 SERPL-SCNC: 27 MMOL/L (ref 20–33)
CREAT SERPL-MCNC: 0.73 MG/DL (ref 0.5–1.4)
D DIMER PPP IA.FEU-MCNC: 0.44 UG/ML (FEU) (ref 0–0.5)
EOSINOPHIL # BLD AUTO: 0.18 K/UL (ref 0–0.51)
EOSINOPHIL NFR BLD: 2.5 % (ref 0–6.9)
ERYTHROCYTE [DISTWIDTH] IN BLOOD BY AUTOMATED COUNT: 49.1 FL (ref 35.9–50)
EST. AVERAGE GLUCOSE BLD GHB EST-MCNC: 123 MG/DL
GLOBULIN SER CALC-MCNC: 3.4 G/DL (ref 1.9–3.5)
GLUCOSE SERPL-MCNC: 156 MG/DL (ref 65–99)
HBA1C MFR BLD: 5.9 % (ref 4–5.6)
HCT VFR BLD AUTO: 47 % (ref 42–52)
HDLC SERPL-MCNC: 51 MG/DL
HGB BLD-MCNC: 16 G/DL (ref 14–18)
IMM GRANULOCYTES # BLD AUTO: 0.02 K/UL (ref 0–0.11)
IMM GRANULOCYTES NFR BLD AUTO: 0.3 % (ref 0–0.9)
LDLC SERPL CALC-MCNC: 99 MG/DL
LV EJECT FRACT  99904: 65
LV EJECT FRACT MOD 2C 99903: 72.02
LV EJECT FRACT MOD 4C 99902: 58.95
LV EJECT FRACT MOD BP 99901: 66.86
LYMPHOCYTES # BLD AUTO: 1.65 K/UL (ref 1–4.8)
LYMPHOCYTES NFR BLD: 23.1 % (ref 22–41)
MAGNESIUM SERPL-MCNC: 1.7 MG/DL (ref 1.5–2.5)
MCH RBC QN AUTO: 33.3 PG (ref 27–33)
MCHC RBC AUTO-ENTMCNC: 34 G/DL (ref 33.7–35.3)
MCV RBC AUTO: 97.9 FL (ref 81.4–97.8)
MONOCYTES # BLD AUTO: 0.76 K/UL (ref 0–0.85)
MONOCYTES NFR BLD AUTO: 10.7 % (ref 0–13.4)
NEUTROPHILS # BLD AUTO: 4.48 K/UL (ref 1.82–7.42)
NEUTROPHILS NFR BLD: 62.8 % (ref 44–72)
NRBC # BLD AUTO: 0 K/UL
NRBC BLD-RTO: 0 /100 WBC
PLATELET # BLD AUTO: 175 K/UL (ref 164–446)
PMV BLD AUTO: 7.9 FL (ref 9–12.9)
POTASSIUM SERPL-SCNC: 4.3 MMOL/L (ref 3.6–5.5)
PROT SERPL-MCNC: 7.2 G/DL (ref 6–8.2)
RBC # BLD AUTO: 4.8 M/UL (ref 4.7–6.1)
SODIUM SERPL-SCNC: 138 MMOL/L (ref 135–145)
TRIGL SERPL-MCNC: 123 MG/DL (ref 0–149)
WBC # BLD AUTO: 7.1 K/UL (ref 4.8–10.8)

## 2021-05-04 PROCEDURE — 93306 TTE W/DOPPLER COMPLETE: CPT | Mod: 26 | Performed by: INTERNAL MEDICINE

## 2021-05-04 PROCEDURE — 700111 HCHG RX REV CODE 636 W/ 250 OVERRIDE (IP): Performed by: HOSPITALIST

## 2021-05-04 PROCEDURE — 96376 TX/PRO/DX INJ SAME DRUG ADON: CPT

## 2021-05-04 PROCEDURE — 83735 ASSAY OF MAGNESIUM: CPT

## 2021-05-04 PROCEDURE — 83036 HEMOGLOBIN GLYCOSYLATED A1C: CPT

## 2021-05-04 PROCEDURE — 85025 COMPLETE CBC W/AUTO DIFF WBC: CPT

## 2021-05-04 PROCEDURE — 99233 SBSQ HOSP IP/OBS HIGH 50: CPT | Performed by: INTERNAL MEDICINE

## 2021-05-04 PROCEDURE — 80053 COMPREHEN METABOLIC PANEL: CPT

## 2021-05-04 PROCEDURE — 80061 LIPID PANEL: CPT

## 2021-05-04 PROCEDURE — 770006 HCHG ROOM/CARE - MED/SURG/GYN SEMI*

## 2021-05-04 PROCEDURE — 700117 HCHG RX CONTRAST REV CODE 255: Performed by: HOSPITALIST

## 2021-05-04 PROCEDURE — A9270 NON-COVERED ITEM OR SERVICE: HCPCS | Performed by: HOSPITALIST

## 2021-05-04 PROCEDURE — 93306 TTE W/DOPPLER COMPLETE: CPT

## 2021-05-04 PROCEDURE — 85379 FIBRIN DEGRADATION QUANT: CPT

## 2021-05-04 PROCEDURE — 700102 HCHG RX REV CODE 250 W/ 637 OVERRIDE(OP): Performed by: HOSPITALIST

## 2021-05-04 PROCEDURE — 96372 THER/PROPH/DIAG INJ SC/IM: CPT

## 2021-05-04 RX ORDER — FUROSEMIDE 10 MG/ML
40 INJECTION INTRAMUSCULAR; INTRAVENOUS
Status: DISCONTINUED | OUTPATIENT
Start: 2021-05-05 | End: 2021-05-06

## 2021-05-04 RX ADMIN — ENOXAPARIN SODIUM 40 MG: 40 INJECTION SUBCUTANEOUS at 18:35

## 2021-05-04 RX ADMIN — FUROSEMIDE 20 MG: 10 INJECTION, SOLUTION INTRAMUSCULAR; INTRAVENOUS at 16:47

## 2021-05-04 RX ADMIN — ACETAMINOPHEN 650 MG: 325 TABLET, FILM COATED ORAL at 07:18

## 2021-05-04 RX ADMIN — ACETAMINOPHEN 650 MG: 325 TABLET, FILM COATED ORAL at 14:07

## 2021-05-04 RX ADMIN — LEVOTHYROXINE SODIUM 150 MCG: 0.07 TABLET ORAL at 05:02

## 2021-05-04 RX ADMIN — POTASSIUM CHLORIDE 20 MEQ: 1500 TABLET, EXTENDED RELEASE ORAL at 18:36

## 2021-05-04 RX ADMIN — ENOXAPARIN SODIUM 40 MG: 40 INJECTION SUBCUTANEOUS at 05:03

## 2021-05-04 RX ADMIN — LOSARTAN POTASSIUM 100 MG: 25 TABLET, FILM COATED ORAL at 05:03

## 2021-05-04 RX ADMIN — FUROSEMIDE 20 MG: 10 INJECTION, SOLUTION INTRAMUSCULAR; INTRAVENOUS at 05:02

## 2021-05-04 RX ADMIN — SENNOSIDES AND DOCUSATE SODIUM 2 TABLET: 8.6; 5 TABLET ORAL at 05:02

## 2021-05-04 RX ADMIN — HUMAN ALBUMIN MICROSPHERES AND PERFLUTREN 3 ML: 10; .22 INJECTION, SOLUTION INTRAVENOUS at 09:20

## 2021-05-04 ASSESSMENT — ENCOUNTER SYMPTOMS
SORE THROAT: 0
SHORTNESS OF BREATH: 1
FALLS: 0
CHILLS: 0
EYE REDNESS: 0
FEVER: 0
LOSS OF CONSCIOUSNESS: 0
FOCAL WEAKNESS: 0
ABDOMINAL PAIN: 0
EYE DISCHARGE: 0

## 2021-05-04 ASSESSMENT — PAIN DESCRIPTION - PAIN TYPE: TYPE: ACUTE PAIN

## 2021-05-04 NOTE — ASSESSMENT & PLAN NOTE
We will start intravenous Lasix for his hypoxemic respiratory failure and volume overload  Resume home losartan with a lower dose

## 2021-05-04 NOTE — PROGRESS NOTES
[facility] [place] [facility]. Ambulated from gurney to bed. PT A&Ox4. Vitals taken. History, allergies and med rec reviewed and admission profile completed.     Pt oriented to unit and POC discussed, including echo, monitoring intake and output, labs, and meds. Welcome folder provided and discussed. Communication board filled out. Pt instructed to call light usage and encouraged to call for any questions, needs, or concerns and prior to getting out of bed. Fall precautions in place. Pt agrees with the plan and declines any needs at this time. Bed locked and low.

## 2021-05-04 NOTE — PROGRESS NOTES
Report received from NOLVIA Demarco. Pt resting, requesting tylenol for headache and medicated per MAR. Echo tech at bedside for study. Pt instructed to notify RN with any needs and agrees.

## 2021-05-04 NOTE — ASSESSMENT & PLAN NOTE
"Differentials include hepatic congestion from heart failure versus hepatic steatosis  Recent echo reviewed shows \"Echogenic liver, most commonly hepatic steatosis\"   Continue to monitor, diuresing, encourage weight loss    "

## 2021-05-04 NOTE — DIETARY
NUTRITION SERVICES: BMI - Pt with BMI >40 (=Body mass index is 47.97 kg/m².), Class III obesity. Weight loss counseling not appropriate in acute care setting. RECOMMEND - If appropriate at DC please refer to outpatient nutrition services for weight management.

## 2021-05-04 NOTE — CARE PLAN
Problem: Venous Thromboembolism (VTW)/Deep Vein Thrombosis (DVT) Prevention:  Goal: Patient will participate in Venous Thrombosis (VTE)/Deep Vein Thrombosis (DVT)Prevention Measures  Outcome: PROGRESSING AS EXPECTED  Flowsheets (Taken 5/3/2021 2130)  Risk Assessment Score: 1  VTE RISK: Moderate  Pharmacologic Prophylaxis Used: LMWH: Enoxaparin(Lovenox)  Note: Encouraged movement and ambulation, explained the importance of VTE prophylaxis.      Problem: Knowledge Deficit  Goal: Knowledge of disease process/condition, treatment plan, diagnostic tests, and medications will improve  Outcome: PROGRESSING AS EXPECTED  Note: Discussed plan of care with patient including echo, medications, monitoring intake and output, and labs. Patient agrees with plan and verbalizes understanding.

## 2021-05-04 NOTE — PROGRESS NOTES
Assumed care of pt. Pt resting comfortably in bed. No needs expressed at this time. Bed is locked and in lowest position. Call light within reach.

## 2021-05-04 NOTE — PROGRESS NOTES
2 RN Skin Check    2 RN skin check complete.   Devices in place: Nasal Cannula and PIV.  Skin assessed under devices: yes.  Confirmed pressure ulcers found on: none.  New potential pressure ulcers noted on none. Wound consult placed N/A.  The following interventions in place Pillows and encourage ambulation and movement.    Patient's skin WNL other than redness noted behind ears and scar on left foot patient states was from a prior surgery.

## 2021-05-04 NOTE — H&P
Hospital Medicine History & Physical Note    Date of Service  5/3/2021    Primary Care Physician  Theresa Lucas P.A.-C.    Consultants  None    Code Status  Full Code    Chief Complaint  Chief Complaint   Patient presents with   • Leg Swelling     BLE x 1.5 weeks   • Shortness of Breath     x 1.5 weeks   • Chest Pressure       History of Presenting Illness  55 y.o. male with a past medical history of hypertension, hypothyroidism untreated obstructive sleep apnea who presented 5/3/2021 with shortness of breath and lower extremity swelling.  Patient reports having progressive shortness of breath over the past 2 weeks.  He does have occasional cough but no sputum production.  He denies having fevers chills lower extremity redness or pain.  He denies having orthopnea or paroxysmal nocturnal dyspnea but reports having obstructive sleep apnea however is not using therapy/CPAP.  He reports that his shortness of breath is mostly with exertion he also reports having lower extremity edema that is particularly worse at the end of the day.     Review of Systems  Review of Systems   Constitutional: Positive for malaise/fatigue. Negative for chills and fever.   HENT: Negative for congestion and sore throat.    Eyes: Negative for pain, discharge and redness.   Respiratory: Positive for shortness of breath. Negative for stridor.    Cardiovascular: Positive for leg swelling. Negative for chest pain, palpitations, orthopnea and PND.   Gastrointestinal: Negative for abdominal pain, blood in stool, diarrhea and vomiting.   Genitourinary: Negative for flank pain, hematuria and urgency.   Musculoskeletal: Negative for myalgias.   Skin: Negative for rash.   Neurological: Negative for speech change, focal weakness, seizures and loss of consciousness.   Endo/Heme/Allergies: Does not bruise/bleed easily.   Psychiatric/Behavioral: Negative for hallucinations and suicidal ideas. The patient is not nervous/anxious.      Past Medical  History   has a past medical history of Arthritis, Hypertension, Hypothyroidism, and Psychiatric disorder.    Surgical History   has a past surgical history that includes other orthopedic surgery (1985); appendectomy (1998); thyroidectomy total (2007); bunionectomy (10/31/2011); and tendon repair (10/31/2011).     Family History  family history includes Cancer in an other family member; Diabetes in his father; Heart Disease in an other family member; Hypertension in an other family member.     Social History   reports that he has never smoked. He has never used smokeless tobacco. He reports current alcohol use. He reports that he does not use drugs.    Allergies  No Known Allergies    Medications  Prior to Admission Medications   Prescriptions Last Dose Informant Patient Reported? Taking?   TURMERIC PO >1 week at OUT Patient Yes Yes   Sig: Take 1 capsule by mouth every day.   acetaminophen (TYLENOL) 500 MG Tab 5/1/2021 at PRN Patient Yes Yes   Sig: Take 1,000 mg by mouth 2 times a day as needed for Mild Pain. 2 tablets = 1,000 mg.   ibuprofen (MOTRIN) 200 MG Tab 4/30/2021 at PRN Patient Yes Yes   Sig: Take 400 mg by mouth 2 times a day as needed for Mild Pain. 2 tablets = 400 mg.   levothyroxine (SYNTHROID) 150 MCG Tab 5/3/2021 at 0715 Patient No No   Sig: Take 1 tablet by mouth every morning on an empty stomach.   losartan (COZAAR) 100 MG Tab 5/3/2021 at 0715 Patient No No   Sig: TAKE 1 TABLET BY MOUTH EVERY DAY   Patient taking differently: Take 100 mg by mouth every morning.      Facility-Administered Medications: None     Physical Exam  Temp:  [36.7 °C (98.1 °F)] 36.7 °C (98.1 °F)  Pulse:  [84-91] 84  Resp:  [18-33] 25  BP: (142-177)/(84-87) 177/87  SpO2:  [89 %-93 %] 90 %    Physical Exam  Vitals and nursing note reviewed.   Constitutional:       Appearance: He is well-developed. He is not diaphoretic.   HENT:      Head: Normocephalic and atraumatic.      Right Ear: External ear normal.      Left Ear: External  ear normal.      Nose: No congestion or rhinorrhea.      Mouth/Throat:      Mouth: Mucous membranes are moist.      Pharynx: No oropharyngeal exudate or posterior oropharyngeal erythema.   Eyes:      General:         Right eye: No discharge.         Left eye: No discharge.      Conjunctiva/sclera: Conjunctivae normal.   Neck:      Vascular: No JVD.   Cardiovascular:      Heart sounds: No friction rub. No gallop.    Pulmonary:      Effort: Pulmonary effort is normal. No respiratory distress.      Breath sounds: No stridor. Rhonchi present. No wheezing.      Comments: Reduced air entry and fine crackles bilaterally basally.   Abdominal:      Palpations: Abdomen is soft.      Tenderness: There is no abdominal tenderness. There is no guarding or rebound.   Musculoskeletal:         General: Swelling present. No tenderness.      Cervical back: No rigidity. No muscular tenderness.      Right lower leg: Edema present.      Left lower leg: Edema present.   Skin:     General: Skin is warm and dry.      Coloration: Skin is pale.      Findings: No rash.   Neurological:      General: No focal deficit present.      Mental Status: He is alert and oriented to person, place, and time.   Psychiatric:         Mood and Affect: Mood normal.         Behavior: Behavior normal.         Laboratory:  Recent Labs     05/03/21  1625   WBC 7.7   RBC 4.79   HEMOGLOBIN 16.0   HEMATOCRIT 46.3   MCV 96.7   MCH 33.4*   MCHC 34.6   RDW 47.2   PLATELETCT 184   MPV 8.1*     Recent Labs     05/03/21  1625   SODIUM 141   POTASSIUM 4.0   CHLORIDE 104   CO2 24   GLUCOSE 117*   BUN 10   CREATININE 0.66   CALCIUM 9.2     Recent Labs     05/03/21  1625   ALTSGPT 71*   ASTSGOT 66*   ALKPHOSPHAT 82   TBILIRUBIN 0.7   GLUCOSE 117*         Recent Labs     05/03/21  1625   NTPROBNP 128*         Recent Labs     05/03/21  1625   TROPONINT <6       Imaging:  DX-CHEST-PORTABLE (1 VIEW)   Final Result      1.  No acute cardiopulmonary abnormality identified.      2.   "Enlarged cardiac silhouette      EC-ECHOCARDIOGRAM COMPLETE W/O CONT    (Results Pending)     Assessment/Plan:  I anticipate this patient is appropriate for observation status at this time.    * Acute hypoxemic respiratory failure (HCC)- (present on admission)  Assessment & Plan  BNP elevated, has bilateral lower extremity edema concerning for heart failure.  Other potential causes includes pulmonary hypertension there is a history of untreated obstructive sleep apnea  We will check echocardiography.  Intravenous diuretics.  Daily strict input and output  Daily standing weights.  Daily BMP to watch renal function, electrolytes.  Replace electrolytes as needed.     Obstructive sleep apnea- (present on admission)  Assessment & Plan  Untreated not currently on CPAP.  Counseling provided, oxygen as needed    Elevated liver enzymes- (present on admission)  Assessment & Plan  Differentials include hepatic congestion from heart failure versus hepatic steatosis  Recent echo reviewed shows \"Echogenic liver, most commonly hepatic steatosis\"   Continue to monitor, diuresing, encourage weight loss      Bilateral lower extremity edema- (present on admission)  Assessment & Plan  With shortness of breath and elevated BNP concerning for heart failure.  We will start intravenous diuretics.  We will check echocardiography to rule out heart failure    Hyperglycemia- (present on admission)  Assessment & Plan  Reports history of prediabetes on diet control only.  We will check glycated hemoglobin, monitor blood sugars consider insulin coverage according to trend.    Essential hypertension- (present on admission)  Assessment & Plan  We will start intravenous Lasix for his hypoxemic respiratory failure and volume overload  Resume home losartan with a lower dose    Hypothyroidism (acquired)- (present on admission)  Assessment & Plan  Resume home levothyroxine  Recent TSH 8.4     "

## 2021-05-04 NOTE — ED NOTES
COVID swab collected and sent to lab.   Pt updated on POC, pt denies any needs at this time. Call light within reach, will continue to monitor.

## 2021-05-04 NOTE — ED PROVIDER NOTES
ED Provider Note    CHIEF COMPLAINT  Chief Complaint   Patient presents with   • Leg Swelling     BLE x 1.5 weeks   • Shortness of Breath     x 1.5 weeks   • Chest Pressure       HPI  Pietro Stauffer is a 55 y.o. male who presents short of breath, states in the last week and a half despite no diet change has been increasing his weight with swelling.  He states his pants do not fit, difficulty putting his shoes on secondary to swelling.  Shortness of breath worse with activity, worse with lying flat.  Patient states no history of similar problems, no cardiac problems.  States he has been taking his medications as prescribed.  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  Patient chews tobacco, does not smoke cigarettes.  No drug use.  He denies diabetes, or high cholesterol.  He has hypertension for risk factors for heart disease.  Currently at rest, breathing improves, no current chest pain    REVIEW OF SYSTEMS    Constitutional: Fatigue, no fever  Respiratory: Orthopnea, dyspnea on exertion  Cardiac: No chest pain  Gastrointestinal: Abdominal swelling  Musculoskeletal: No acute neck or back pain  Neurologic: Denies headache  Skin: Peripheral swelling       All other systems are negative.       PAST MEDICAL HISTORY  Past Medical History:   Diagnosis Date   • Arthritis    • Hypertension    • Hypothyroidism     total thyroidectomy   • Psychiatric disorder     depression       FAMILY HISTORY  Family History   Problem Relation Age of Onset   • Heart Disease Other    • Hypertension Other    • Cancer Other    • Diabetes Father        SOCIAL HISTORY  Social History     Socioeconomic History   • Marital status:      Spouse name: Not on file   • Number of children: Not on file   • Years of education: Not on file   • Highest education level: Not on file   Occupational History   • Not on file   Tobacco  Use   • Smoking status: Never Smoker   • Smokeless tobacco: Never Used   • Tobacco comment: chew   Substance and Sexual Activity   • Alcohol use: Yes   • Drug use: No   • Sexual activity: Not on file   Other Topics Concern   • Not on file   Social History Narrative   • Not on file     Social Determinants of Health     Financial Resource Strain:    • Difficulty of Paying Living Expenses:    Food Insecurity:    • Worried About Running Out of Food in the Last Year:    • Ran Out of Food in the Last Year:    Transportation Needs:    • Lack of Transportation (Medical):    • Lack of Transportation (Non-Medical):    Physical Activity:    • Days of Exercise per Week:    • Minutes of Exercise per Session:    Stress:    • Feeling of Stress :    Social Connections:    • Frequency of Communication with Friends and Family:    • Frequency of Social Gatherings with Friends and Family:    • Attends [employer] Services:    • Active Member of Clubs or Organizations:    • Attends Club or Organization Meetings:    • Marital Status:    Intimate Partner Violence:    • Fear of Current or Ex-Partner:    • Emotionally Abused:    • Physically Abused:    • Sexually Abused:        SURGICAL HISTORY  Past Surgical History:   Procedure Laterality Date   • BUNIONECTOMY  10/31/2011    Performed by YARY STONE at [facility]S   • TENDON REPAIR  10/31/2011    Performed by YARY STONE at [facility]   • THYROIDECTOMY TOTAL  2007   • APPENDECTOMY  1998   • OTHER ORTHOPEDIC SURGERY  1985    ganglion cyst excision, bilateral x2 left x1 right       CURRENT MEDICATIONS  Home Medications     Reviewed by John Pearson (Pharmacy Tech) on 05/03/21 at 1646  Med List Status: Complete   Medication Last Dose Status   acetaminophen (TYLENOL) 500 MG Tab 5/1/2021 Active   ibuprofen (MOTRIN) 200 MG Tab 4/30/2021 Active   levothyroxine (SYNTHROID) 150 MCG Tab 5/3/2021 Active   losartan (COZAAR) 100 MG Tab 5/3/2021 Active  "  TURMERIC PO >1 week Active                ALLERGIES  No Known Allergies    PHYSICAL EXAM  VITAL SIGNS: /87   Pulse 85   Temp 36.7 °C (98.1 °F) (Temporal)   Resp (!) 25   Ht 1.803 m (5' 11\")   Wt (!) 153 kg (336 lb 13.8 oz)   SpO2 93%   BMI 46.98 kg/m²   Constitutional:  Non-toxic appearance.   HENT: No facial swelling  Eyes: Anicteric, no conjunctivitis.     Cardiovascular: Normal heart rate, Normal rhythm  Pulmonary:  No wheezing, No rales.  Diminished breath sounds both lung bases  Gastrointestinal: Soft, No tenderness, No masses.  Protuberant abdomen, distended  Skin: Warm, Dry, No cyanosis.  Symmetric pitting edema the lower extremities  Neurologic: Speech is clear, follows commands, facial expression is symmetrical.  Strength normal  Psychiatric: Affect normal,  Mood normal.  Patient is calm and cooperative  Musculoskeletal: No chest wall tenderness    EKG/Labs  Results for orders placed or performed during the hospital encounter of 05/03/21   CBC with Differential   Result Value Ref Range    WBC 7.7 4.8 - 10.8 K/uL    RBC 4.79 4.70 - 6.10 M/uL    Hemoglobin 16.0 14.0 - 18.0 g/dL    Hematocrit 46.3 42.0 - 52.0 %    MCV 96.7 81.4 - 97.8 fL    MCH 33.4 (H) 27.0 - 33.0 pg    MCHC 34.6 33.7 - 35.3 g/dL    RDW 47.2 35.9 - 50.0 fL    Platelet Count 184 164 - 446 K/uL    MPV 8.1 (L) 9.0 - 12.9 fL    Neutrophils-Polys 66.00 44.00 - 72.00 %    Lymphocytes 23.50 22.00 - 41.00 %    Monocytes 8.20 0.00 - 13.40 %    Eosinophils 1.30 0.00 - 6.90 %    Basophils 0.60 0.00 - 1.80 %    Immature Granulocytes 0.40 0.00 - 0.90 %    Nucleated RBC 0.00 /100 WBC    Neutrophils (Absolute) 5.08 1.82 - 7.42 K/uL    Lymphs (Absolute) 1.81 1.00 - 4.80 K/uL    Monos (Absolute) 0.63 0.00 - 0.85 K/uL    Eos (Absolute) 0.10 0.00 - 0.51 K/uL    Baso (Absolute) 0.05 0.00 - 0.12 K/uL    Immature Granulocytes (abs) 0.03 0.00 - 0.11 K/uL    NRBC (Absolute) 0.00 K/uL   Complete Metabolic Panel (CMP)   Result Value Ref Range    Sodium " 141 135 - 145 mmol/L    Potassium 4.0 3.6 - 5.5 mmol/L    Chloride 104 96 - 112 mmol/L    Co2 24 20 - 33 mmol/L    Anion Gap 13.0 7.0 - 16.0    Glucose 117 (H) 65 - 99 mg/dL    Bun 10 8 - 22 mg/dL    Creatinine 0.66 0.50 - 1.40 mg/dL    Calcium 9.2 8.4 - 10.2 mg/dL    AST(SGOT) 66 (H) 12 - 45 U/L    ALT(SGPT) 71 (H) 2 - 50 U/L    Alkaline Phosphatase 82 30 - 99 U/L    Total Bilirubin 0.7 0.1 - 1.5 mg/dL    Albumin 3.9 3.2 - 4.9 g/dL    Total Protein 7.1 6.0 - 8.2 g/dL    Globulin 3.2 1.9 - 3.5 g/dL    A-G Ratio 1.2 g/dL   Troponin   Result Value Ref Range    Troponin T <6 6 - 19 ng/L   proBrain Natriuretic Peptide, NT   Result Value Ref Range    NT-proBNP 128 (H) 0 - 125 pg/mL   ESTIMATED GFR   Result Value Ref Range    GFR If African American >60 >60 mL/min/1.73 m 2    GFR If Non African American >60 >60 mL/min/1.73 m 2   COV-2, FLU A/B, AND RSV BY PCR (2-4 HOURS CEPHEID): Collect NP swab in VTM    Specimen: Respirate   Result Value Ref Range    Influenza virus A RNA Negative Negative    Influenza virus B, PCR Negative Negative    RSV, PCR Negative Negative    SARS-CoV-2 by PCR NotDetected     SARS-CoV-2 Source NP Swab    EKG   Result Value Ref Range    Report       [facility]    Test Date:  2021  Pt Name:    SERAFIN GOLDEN                Department: E[facility]M  MRN:        [id number]                      Room:  Gender:     Male                         Technician: DERRICK  :        1965                   Requested By:ER TRIAGE PROTOCOL  Order #:    [id number]                    Reading MD: SYLVIE BECKETT MD    Measurements  Intervals                                Axis  Rate:       85                           P:          42  TX:         162                          QRS:        69  QRSD:       115                          T:          52  QT:         414  QTc:        493    Interpretive Statements  Sinus rhythm  Nonspecific intraventricular conduction delay  Baseline  wander in lead(s) V2  No previous ECG available for comparison  Electronically Signed On 5-3-2021 18:51:00 PDT by PRESTON BECKETT MD           RADIOLOGY/PROCEDURES  DX-CHEST-PORTABLE (1 VIEW)   Final Result      1.  No acute cardiopulmonary abnormality identified.      2.  Enlarged cardiac silhouette      EC-ECHOCARDIOGRAM COMPLETE W/O CONT    (Results Pending)         COURSE & MEDICAL DECISION MAKING  Pertinent Labs & Imaging studies reviewed. (See chart for details)  Differential diagnosis including CHF, pneumonia, myocardial infarction.  No tachycardia, no pleuritic chest pain, no history of DVT, pulmonary embolism less likely.  Patient with dyspnea, orthopnea, swelling both abdomen, legs, concerning for anasarca.  Concern about CHF on the patient although BNP is not dramatically elevated and chest x-ray did not show significant pulmonary edema.  Patient states no diet change, no known reason for new swelling.  Plan for hospitalization, ongoing cardiac work-up and possible diuresis.  Patient remained stable in the emergency department.  He is negative for COVID-19.    FINAL IMPRESSION     1. Dyspnea, unspecified type     2. Anasarca                     Electronically signed by: Preston Beckett M.D., 5/3/2021 6:51 PM

## 2021-05-04 NOTE — ASSESSMENT & PLAN NOTE
BNP elevated, has bilateral lower extremity edema concerning for heart failure.  Other potential causes includes pulmonary hypertension there is a history of untreated obstructive sleep apnea  Echo normal  Intravenous diuretics (increased yesterday)  Daily strict input and output  Daily standing weights.  Ddimer negative  Daily BMP to watch renal function, electrolytes.  Replace electrolytes as needed.

## 2021-05-05 LAB
ANION GAP SERPL CALC-SCNC: 10 MMOL/L (ref 7–16)
BUN SERPL-MCNC: 13 MG/DL (ref 8–22)
CALCIUM SERPL-MCNC: 9.1 MG/DL (ref 8.4–10.2)
CHLORIDE SERPL-SCNC: 98 MMOL/L (ref 96–112)
CO2 SERPL-SCNC: 30 MMOL/L (ref 20–33)
CREAT SERPL-MCNC: 0.75 MG/DL (ref 0.5–1.4)
GLUCOSE SERPL-MCNC: 128 MG/DL (ref 65–99)
POTASSIUM SERPL-SCNC: 4.2 MMOL/L (ref 3.6–5.5)
SODIUM SERPL-SCNC: 138 MMOL/L (ref 135–145)

## 2021-05-05 PROCEDURE — 700102 HCHG RX REV CODE 250 W/ 637 OVERRIDE(OP): Performed by: HOSPITALIST

## 2021-05-05 PROCEDURE — 700111 HCHG RX REV CODE 636 W/ 250 OVERRIDE (IP): Performed by: HOSPITALIST

## 2021-05-05 PROCEDURE — A9270 NON-COVERED ITEM OR SERVICE: HCPCS | Performed by: HOSPITALIST

## 2021-05-05 PROCEDURE — 700111 HCHG RX REV CODE 636 W/ 250 OVERRIDE (IP): Performed by: INTERNAL MEDICINE

## 2021-05-05 PROCEDURE — 770006 HCHG ROOM/CARE - MED/SURG/GYN SEMI*

## 2021-05-05 PROCEDURE — 99233 SBSQ HOSP IP/OBS HIGH 50: CPT | Performed by: INTERNAL MEDICINE

## 2021-05-05 PROCEDURE — 80048 BASIC METABOLIC PNL TOTAL CA: CPT

## 2021-05-05 RX ADMIN — ACETAMINOPHEN 650 MG: 325 TABLET, FILM COATED ORAL at 16:21

## 2021-05-05 RX ADMIN — LEVOTHYROXINE SODIUM 150 MCG: 0.07 TABLET ORAL at 06:05

## 2021-05-05 RX ADMIN — POTASSIUM CHLORIDE 20 MEQ: 1500 TABLET, EXTENDED RELEASE ORAL at 17:14

## 2021-05-05 RX ADMIN — ENOXAPARIN SODIUM 40 MG: 40 INJECTION SUBCUTANEOUS at 17:14

## 2021-05-05 RX ADMIN — ENOXAPARIN SODIUM 40 MG: 40 INJECTION SUBCUTANEOUS at 06:05

## 2021-05-05 RX ADMIN — SENNOSIDES AND DOCUSATE SODIUM 2 TABLET: 8.6; 5 TABLET ORAL at 06:05

## 2021-05-05 RX ADMIN — ACETAMINOPHEN 650 MG: 325 TABLET, FILM COATED ORAL at 03:35

## 2021-05-05 RX ADMIN — LOSARTAN POTASSIUM 100 MG: 25 TABLET, FILM COATED ORAL at 06:04

## 2021-05-05 RX ADMIN — ACETAMINOPHEN 650 MG: 325 TABLET, FILM COATED ORAL at 22:02

## 2021-05-05 RX ADMIN — FUROSEMIDE 40 MG: 10 INJECTION, SOLUTION INTRAMUSCULAR; INTRAVENOUS at 07:44

## 2021-05-05 RX ADMIN — FUROSEMIDE 40 MG: 10 INJECTION, SOLUTION INTRAMUSCULAR; INTRAVENOUS at 16:17

## 2021-05-05 ASSESSMENT — ENCOUNTER SYMPTOMS
EYE DISCHARGE: 0
FEVER: 0
LOSS OF CONSCIOUSNESS: 0
FOCAL WEAKNESS: 0
SORE THROAT: 0
SHORTNESS OF BREATH: 1
ABDOMINAL PAIN: 0
CHILLS: 0
FALLS: 0
EYE REDNESS: 0

## 2021-05-05 ASSESSMENT — FIBROSIS 4 INDEX: FIB4 SCORE: 2.64

## 2021-05-05 ASSESSMENT — PAIN DESCRIPTION - PAIN TYPE: TYPE: ACUTE PAIN

## 2021-05-05 NOTE — HOSPITAL COURSE
55-year-old male with a history of morbid obesity, hypertension, hypothyroidism, untreated obstructive sleep apnea who presented with shortness of breath and lower extremity swelling and weight gain.  CXR negative. COVID negative.  Echo normal.  Required 2-3 L NC.  Started on IV lasix.

## 2021-05-05 NOTE — CARE PLAN
Problem: Communication  Goal: The ability to communicate needs accurately and effectively will improve  Outcome: PROGRESSING AS EXPECTED     Problem: Safety  Goal: Will remain free from injury  Outcome: PROGRESSING AS EXPECTED  Goal: Will remain free from falls  Outcome: PROGRESSING AS EXPECTED     Problem: Fluid Volume:  Goal: Will maintain balanced intake and output  Outcome: PROGRESSING AS EXPECTED   Pt has negative net balance for fluid I/Os. Goal is to continue to IV diurese pt throughout the shift and hopefully see improvement in O2 sat and swelling in lower extremities.   Problem: Respiratory:  Goal: Respiratory status will improve  Outcome: PROGRESSING SLOWER THAN EXPECTED   Pt given IS. Goal is to reach 2000 by end of shift. Pt self-motivated and using a couple of times per hour. Goal is to wean pt off of O2 needs by the end of shift for potential discharge tomorrow.

## 2021-05-05 NOTE — PROGRESS NOTES
Received report and assumed care of pt. Pt is alert and resting in bed. Walking O2 eval performed, pt 89-90% on room air and 90-94% sitting at room air. MD rounded with pt. Plan to continue IV diuresis and hopefully improve pts O2 saturation. Spoke with pt about education regarding sleep apnea and wearing CPAP, as well as positional and dietary changes to help improve patients breathing. Incentive spirometer given to pt, pt self motivated with use.

## 2021-05-05 NOTE — PROGRESS NOTES
[facility] Medicine Daily Progress Note    Date of Service  5/5/2021    Chief Complaint  55 y.o. male admitted 5/3/2021 with shortness of breath and swelling    Hospital Course  55-year-old male with a history of morbid obesity, hypertension, hypothyroidism, untreated obstructive sleep apnea who presented with shortness of breath and lower extremity swelling and weight gain.  CXR negative. COVID negative.  Echo normal.  Required 2-3 L NC.  Started on IV lasix.       Interval Problem Update  - weaning oxygen, ambulated with 89-90%, sitting 90-94%, still feeling SOB, not as edematous, but still having extremity cramping  - will continue IV diuresis today, likely transition to oral diuresis tomorrow and dc  - again discussed importance of wearing CPAP at home  - ddimer negative    Consultants/Specialty  None    Code Status  Full Code    Disposition  Likely home when medically clear    Review of Systems  Review of Systems   Constitutional: Negative for chills and fever.        Weight gain   HENT: Negative for congestion and sore throat.    Eyes: Negative for discharge and redness.   Respiratory: Positive for shortness of breath.    Cardiovascular: Positive for leg swelling. Negative for chest pain.   Gastrointestinal: Negative for abdominal pain.   Genitourinary: Negative for hematuria.   Musculoskeletal: Negative for falls.        Muscle cramps   Skin: Negative for rash.   Neurological: Negative for focal weakness and loss of consciousness.        Physical Exam  Temp:  [36.1 °C (97 °F)-36.8 °C (98.3 °F)] 36.8 °C (98.2 °F)  Pulse:  [74-87] 81  Resp:  [16-20] 18  BP: (102-158)/(59-81) 102/70  SpO2:  [89 %-93 %] 91 %    Physical Exam  Vitals and nursing note reviewed.   Constitutional:       General: He is not in acute distress.     Appearance: He is obese. He is not ill-appearing, toxic-appearing or diaphoretic.   HENT:      Head: Normocephalic and atraumatic.      Nose: Nose normal.      Mouth/Throat:      Mouth: Mucous  membranes are moist.   Eyes:      General: No scleral icterus.     Conjunctiva/sclera: Conjunctivae normal.   Cardiovascular:      Rate and Rhythm: Normal rate and regular rhythm.      Heart sounds: No murmur. No friction rub. No gallop.    Pulmonary:      Effort: Pulmonary effort is normal.      Breath sounds: Normal breath sounds.   Abdominal:      General: Bowel sounds are normal. There is distension.      Palpations: Abdomen is soft.      Tenderness: There is no abdominal tenderness. There is no guarding.   Musculoskeletal:      Cervical back: Normal range of motion.      Right lower leg: Edema present.      Left lower leg: Edema present.   Skin:     General: Skin is warm.   Neurological:      Mental Status: He is oriented to person, place, and time.   Psychiatric:         Mood and Affect: Mood normal.         Behavior: Behavior normal.         Fluids    Intake/Output Summary (Last 24 hours) at 5/5/2021 1415  Last data filed at 5/5/2021 0800  Gross per 24 hour   Intake --   Output 775 ml   Net -775 ml       Laboratory  Recent Labs     05/03/21  1625 05/04/21  0511   WBC 7.7 7.1   RBC 4.79 4.80   HEMOGLOBIN 16.0 16.0   HEMATOCRIT 46.3 47.0   MCV 96.7 97.9*   MCH 33.4* 33.3*   MCHC 34.6 34.0   RDW 47.2 49.1   PLATELETCT 184 175   MPV 8.1* 7.9*     Recent Labs     05/03/21  1625 05/04/21  0511 05/05/21  0409   SODIUM 141 138 138   POTASSIUM 4.0 4.3 4.2   CHLORIDE 104 100 98   CO2 24 27 30   GLUCOSE 117* 156* 128*   BUN 10 14 13   CREATININE 0.66 0.73 0.75   CALCIUM 9.2 9.1 9.1             Recent Labs     05/04/21  0511   TRIGLYCERIDE 123   HDL 51   LDL 99       Imaging  EC-ECHOCARDIOGRAM COMPLETE W/ CONT   Final Result      DX-CHEST-PORTABLE (1 VIEW)   Final Result      1.  No acute cardiopulmonary abnormality identified.      2.  Enlarged cardiac silhouette           Assessment/Plan  * Acute hypoxemic respiratory failure (HCC)- (present on admission)  Assessment & Plan  BNP elevated, has bilateral lower extremity  "edema concerning for heart failure.  Other potential causes includes pulmonary hypertension there is a history of untreated obstructive sleep apnea  Echo normal  Intravenous diuretics (increased yesterday)  Daily strict input and output  Daily standing weights.  Ddimer negative  Daily BMP to watch renal function, electrolytes.  Replace electrolytes as needed.     Obstructive sleep apnea- (present on admission)  Assessment & Plan  Untreated not currently on CPAP.  Counseling provided, oxygen as needed    Elevated liver enzymes- (present on admission)  Assessment & Plan  Differentials include hepatic congestion from heart failure versus hepatic steatosis  Recent echo reviewed shows \"Echogenic liver, most commonly hepatic steatosis\"   Continue to monitor, diuresing, encourage weight loss      Bilateral lower extremity edema- (present on admission)  Assessment & Plan  Diuresing    Hyperglycemia- (present on admission)  Assessment & Plan  Reports history of prediabetes on diet control only.  A1c 5.9    Essential hypertension- (present on admission)  Assessment & Plan  We will start intravenous Lasix for his hypoxemic respiratory failure and volume overload  Resume home losartan with a lower dose    Hypothyroidism (acquired)- (present on admission)  Assessment & Plan  Resume home levothyroxine  Recent TSH 8.4     Prediabetes- (present on admission)  Assessment & Plan  A1c 5.9  Diabetic diet       VTE prophylaxis: Lovenox      "

## 2021-05-05 NOTE — PROGRESS NOTES
Report received from NOLVIA Schrader. Plan of care discussed at patient's bedside. Whiteboard has been updated. Pt is resting comfortably in bed and is requesting a snack and a shower. Snack brought to bedside. Safety precautions in place and call light within reach.

## 2021-05-05 NOTE — CARE PLAN
Problem: Communication  Goal: The ability to communicate needs accurately and effectively will improve  Outcome: PROGRESSING AS EXPECTED  Note: Pt is able to fully express and communicate needs appropriately. POC discussed and pt verbalized understanding.       Problem: Safety  Goal: Will remain free from injury  Outcome: PROGRESSING AS EXPECTED  Note: Pt has remained free from falls and all safety measures are put in place.       Problem: Venous Thromboembolism (VTW)/Deep Vein Thrombosis (DVT) Prevention:  Goal: Patient will participate in Venous Thrombosis (VTE)/Deep Vein Thrombosis (DVT)Prevention Measures  Outcome: PROGRESSING AS EXPECTED  Note: Pt is self motivated to continue to engage in ambulation and ROM exercises. Pt was educated on importance of DVT prophylaxis using both pharmacological and non-pharmacological methods. Pt was educated on Lovenox administration as a prophylactic DVT medication and is compliant with it's use.       Problem: Knowledge Deficit  Goal: Knowledge of disease process/condition, treatment plan, diagnostic tests, and medications will improve  Outcome: PROGRESSING AS EXPECTED  Note: Pt educated on admitting condition, diagnostic tests, and medications associated with POC.       Problem: Respiratory:  Goal: Respiratory status will improve  Outcome: PROGRESSING AS EXPECTED  Note: Pt educated on coughing and deep breathing exercises as well as oxygen administration. Pt is compliant in completing breathing exercises and is compliant in oxygen use.

## 2021-05-05 NOTE — PROGRESS NOTES
Hospital Medicine Daily Progress Note    Date of Service  5/4/2021    Chief Complaint  55 y.o. male admitted 5/3/2021 with shortness of breath and swelling    Hospital Course  55-year-old male with a history of morbid obesity, hypertension, hypothyroidism, untreated obstructive sleep apnea who presented with shortness of breath and lower extremity swelling and weight gain.  CXR negative. COVID negative.  Echo normal.  Required 2-3 L NC.  Started on IV lasix.       Interval Problem Update  - Still requiring NC (2L)  - Echo normal  - Will increase lasix today, check ddimer if high get CTA chest  - Discussed importance of wearing cpap machine and mask    Consultants/Specialty  None    Code Status  Full Code    Disposition  Likely home when medically clear    Review of Systems  Review of Systems   Constitutional: Negative for chills and fever.        Weight gain   HENT: Negative for congestion and sore throat.    Eyes: Negative for discharge and redness.   Respiratory: Positive for shortness of breath.    Cardiovascular: Positive for leg swelling. Negative for chest pain.   Gastrointestinal: Negative for abdominal pain.   Genitourinary: Negative for hematuria.   Musculoskeletal: Negative for falls.   Skin: Negative for rash.   Neurological: Negative for focal weakness and loss of consciousness.        Physical Exam  Temp:  [36.1 °C (97 °F)-37 °C (98.6 °F)] 36.1 °C (97 °F)  Pulse:  [70-92] 74  Resp:  [16-28] 16  BP: (126-177)/(64-87) 158/80  SpO2:  [89 %-93 %] 89 %    Physical Exam  Vitals and nursing note reviewed.   Constitutional:       General: He is not in acute distress.     Appearance: He is obese. He is not ill-appearing, toxic-appearing or diaphoretic.   HENT:      Head: Normocephalic and atraumatic.      Nose: Nose normal.      Mouth/Throat:      Mouth: Mucous membranes are moist.   Eyes:      General: No scleral icterus.     Conjunctiva/sclera: Conjunctivae normal.   Cardiovascular:      Rate and Rhythm:  Normal rate and regular rhythm.      Heart sounds: No murmur. No friction rub. No gallop.    Pulmonary:      Effort: Pulmonary effort is normal.      Breath sounds: Normal breath sounds.   Abdominal:      General: Bowel sounds are normal. There is distension.      Palpations: Abdomen is soft.      Tenderness: There is no abdominal tenderness. There is no guarding.   Musculoskeletal:      Cervical back: Normal range of motion.      Right lower leg: Edema present.      Left lower leg: Edema present.   Skin:     General: Skin is warm.   Neurological:      Mental Status: He is oriented to person, place, and time.   Psychiatric:         Mood and Affect: Mood normal.         Behavior: Behavior normal.         Fluids    Intake/Output Summary (Last 24 hours) at 5/4/2021 1703  Last data filed at 5/4/2021 0800  Gross per 24 hour   Intake 540 ml   Output 2650 ml   Net -2110 ml       Laboratory  Recent Labs     05/03/21  1625 05/04/21  0511   WBC 7.7 7.1   RBC 4.79 4.80   HEMOGLOBIN 16.0 16.0   HEMATOCRIT 46.3 47.0   MCV 96.7 97.9*   MCH 33.4* 33.3*   MCHC 34.6 34.0   RDW 47.2 49.1   PLATELETCT 184 175   MPV 8.1* 7.9*     Recent Labs     05/03/21  1625 05/04/21  0511   SODIUM 141 138   POTASSIUM 4.0 4.3   CHLORIDE 104 100   CO2 24 27   GLUCOSE 117* 156*   BUN 10 14   CREATININE 0.66 0.73   CALCIUM 9.2 9.1             Recent Labs     05/04/21  0511   TRIGLYCERIDE 123   HDL 51   LDL 99       Imaging  EC-ECHOCARDIOGRAM COMPLETE W/ CONT   Final Result      DX-CHEST-PORTABLE (1 VIEW)   Final Result      1.  No acute cardiopulmonary abnormality identified.      2.  Enlarged cardiac silhouette           Assessment/Plan  * Acute hypoxemic respiratory failure (HCC)- (present on admission)  Assessment & Plan  BNP elevated, has bilateral lower extremity edema concerning for heart failure.  Other potential causes includes pulmonary hypertension there is a history of untreated obstructive sleep apnea  Echo normal  Intravenous diuretics  "(increase today)  Daily strict input and output  Daily standing weights.  Will check ddimer  Daily BMP to watch renal function, electrolytes.  Replace electrolytes as needed.     Obstructive sleep apnea- (present on admission)  Assessment & Plan  Untreated not currently on CPAP.  Counseling provided, oxygen as needed    Elevated liver enzymes- (present on admission)  Assessment & Plan  Differentials include hepatic congestion from heart failure versus hepatic steatosis  Recent echo reviewed shows \"Echogenic liver, most commonly hepatic steatosis\"   Continue to monitor, diuresing, encourage weight loss      Bilateral lower extremity edema- (present on admission)  Assessment & Plan  Diuresing    Hyperglycemia- (present on admission)  Assessment & Plan  Reports history of prediabetes on diet control only.  A1c 5.9    Essential hypertension- (present on admission)  Assessment & Plan  We will start intravenous Lasix for his hypoxemic respiratory failure and volume overload  Resume home losartan with a lower dose    Hypothyroidism (acquired)- (present on admission)  Assessment & Plan  Resume home levothyroxine  Recent TSH 8.4        VTE prophylaxis: Lovenox      "

## 2021-05-06 VITALS
WEIGHT: 315 LBS | RESPIRATION RATE: 18 BRPM | TEMPERATURE: 98.2 F | OXYGEN SATURATION: 95 % | SYSTOLIC BLOOD PRESSURE: 108 MMHG | BODY MASS INDEX: 44.1 KG/M2 | HEIGHT: 71 IN | HEART RATE: 86 BPM | DIASTOLIC BLOOD PRESSURE: 65 MMHG

## 2021-05-06 PROBLEM — J96.01 ACUTE HYPOXEMIC RESPIRATORY FAILURE (HCC): Status: RESOLVED | Noted: 2021-05-03 | Resolved: 2021-05-06

## 2021-05-06 LAB
ANION GAP SERPL CALC-SCNC: 9 MMOL/L (ref 7–16)
BUN SERPL-MCNC: 19 MG/DL (ref 8–22)
CALCIUM SERPL-MCNC: 9 MG/DL (ref 8.4–10.2)
CHLORIDE SERPL-SCNC: 98 MMOL/L (ref 96–112)
CO2 SERPL-SCNC: 30 MMOL/L (ref 20–33)
CREAT SERPL-MCNC: 0.77 MG/DL (ref 0.5–1.4)
GLUCOSE SERPL-MCNC: 129 MG/DL (ref 65–99)
MAGNESIUM SERPL-MCNC: 2 MG/DL (ref 1.5–2.5)
PHOSPHATE SERPL-MCNC: 3.9 MG/DL (ref 2.5–4.5)
POTASSIUM SERPL-SCNC: 4 MMOL/L (ref 3.6–5.5)
SODIUM SERPL-SCNC: 137 MMOL/L (ref 135–145)

## 2021-05-06 PROCEDURE — 700102 HCHG RX REV CODE 250 W/ 637 OVERRIDE(OP): Performed by: HOSPITALIST

## 2021-05-06 PROCEDURE — 80048 BASIC METABOLIC PNL TOTAL CA: CPT

## 2021-05-06 PROCEDURE — 700111 HCHG RX REV CODE 636 W/ 250 OVERRIDE (IP): Performed by: INTERNAL MEDICINE

## 2021-05-06 PROCEDURE — 99239 HOSP IP/OBS DSCHRG MGMT >30: CPT | Performed by: INTERNAL MEDICINE

## 2021-05-06 PROCEDURE — A9270 NON-COVERED ITEM OR SERVICE: HCPCS | Performed by: HOSPITALIST

## 2021-05-06 PROCEDURE — 84100 ASSAY OF PHOSPHORUS: CPT

## 2021-05-06 PROCEDURE — 83735 ASSAY OF MAGNESIUM: CPT

## 2021-05-06 PROCEDURE — 700111 HCHG RX REV CODE 636 W/ 250 OVERRIDE (IP): Performed by: HOSPITALIST

## 2021-05-06 RX ORDER — FUROSEMIDE 10 MG/ML
80 INJECTION INTRAMUSCULAR; INTRAVENOUS
Status: DISCONTINUED | OUTPATIENT
Start: 2021-05-06 | End: 2021-05-06 | Stop reason: HOSPADM

## 2021-05-06 RX ORDER — FUROSEMIDE 40 MG/1
40 TABLET ORAL DAILY
Qty: 30 TABLET | Refills: 0 | Status: SHIPPED | OUTPATIENT
Start: 2021-05-06 | End: 2021-05-11 | Stop reason: SDUPTHER

## 2021-05-06 RX ORDER — POTASSIUM CHLORIDE 20 MEQ/1
20 TABLET, EXTENDED RELEASE ORAL EVERY EVENING
Qty: 30 TABLET | Refills: 0 | Status: SHIPPED | OUTPATIENT
Start: 2021-05-06 | End: 2021-05-11 | Stop reason: SDUPTHER

## 2021-05-06 RX ADMIN — FUROSEMIDE 40 MG: 10 INJECTION, SOLUTION INTRAMUSCULAR; INTRAVENOUS at 05:04

## 2021-05-06 RX ADMIN — ENOXAPARIN SODIUM 40 MG: 40 INJECTION SUBCUTANEOUS at 05:04

## 2021-05-06 RX ADMIN — LOSARTAN POTASSIUM 100 MG: 25 TABLET, FILM COATED ORAL at 05:06

## 2021-05-06 RX ADMIN — LEVOTHYROXINE SODIUM 150 MCG: 0.07 TABLET ORAL at 05:05

## 2021-05-06 RX ADMIN — ACETAMINOPHEN 650 MG: 325 TABLET, FILM COATED ORAL at 05:13

## 2021-05-06 ASSESSMENT — PAIN DESCRIPTION - PAIN TYPE
TYPE: ACUTE PAIN;CHRONIC PAIN
TYPE: ACUTE PAIN

## 2021-05-06 NOTE — CARE PLAN
Problem: Safety  Goal: Will remain free from injury  Outcome: PROGRESSING AS EXPECTED  Note: Safety precautions in place.  Pt placed close to nursing station. Bed locked and in lowest position, call light within reach.  Pt educated on fall risk.     Problem: Venous Thromboembolism (VTW)/Deep Vein Thrombosis (DVT) Prevention:  Goal: Patient will participate in Venous Thrombosis (VTE)/Deep Vein Thrombosis (DVT)Prevention Measures  Outcome: PROGRESSING AS EXPECTED  Note: Pt ambulating to bathroom and tolerating well. Pt frequently sitting edge of bed and sitting up in bed.

## 2021-05-06 NOTE — DISCHARGE INSTRUCTIONS
Discharge Instructions per Yany Britton M.D.    Mr. Stauffer,    You were admitted with shortness of breath and swelling and found to have hypoxia requiring oxygen and fluid overload requiring IV diuresis.  Your echo of your heart was normal.  You will be referred to the lung specialist.  You will be discharged with lasix daily and potassium supplementation.  You will need to call you primary care physician to be seen in 1 week and get lab work (electrolytes). Please weigh your CPAP mask nightly.         Return to ER if you develop worsening shortness of breath, chest pain, cough, or other concerning symptoms.    Discharge Instructions    Discharged to home by car with self. Discharged via walking, hospital escort: Yes.  Special equipment needed: Not Applicable    Be sure to schedule a follow-up appointment with your primary care doctor or any specialists as instructed.     Discharge Plan:   Diet Plan: Discussed  Activity Level: Discussed  Confirmed Follow up Appointment: Appointment Scheduled  Confirmed Symptoms Management: Discussed  Medication Reconciliation Updated: Yes    I understand that a diet low in cholesterol, fat, and sodium is recommended for good health. Unless I have been given specific instructions below for another diet, I accept this instruction as my diet prescription.   Other diet: Cardiac     Special Instructions: None    · Is patient discharged on Warfarin / Coumadin?   No     Depression / Suicide Risk    As you are discharged from this [facility] facility, it is important to learn how to keep safe from harming yourself.    Recognize the warning signs:  · Abrupt changes in personality, positive or negative- including increase in energy   · Giving away possessions  · Change in eating patterns- significant weight changes-  positive or negative  · Change in sleeping patterns- unable to sleep or sleeping all the time   · Unwillingness or inability to communicate  · Depression  · Unusual  sadness, discouragement and loneliness  · Talk of wanting to die  · Neglect of personal appearance   · Rebelliousness- reckless behavior  · Withdrawal from people/activities they love  · Confusion- inability to concentrate     If you or a loved one observes any of these behaviors or has concerns about self-harm, here's what you can do:  · Talk about it- your feelings and reasons for harming yourself  · Remove any means that you might use to hurt yourself (examples: pills, rope, extension cords, firearm)  · Get professional help from the community (Mental Health, Substance Abuse, psychological counseling)  · Do not be alone:Call your Safe Contact- someone whom you trust who will be there for you.  · Call your local CRISIS HOTLINE 143-6806 or 257-631-9218  · Call your local Children's Mobile Crisis Response Team [place] (757) 316-5570 or www.[employer]  · Call the toll free National Suicide Prevention Hotlines   · National Suicide Prevention Lifeline 674-735-FEZR (3371)  · National Hope Line Network 800-SUICIDE (954-9496)               Edema    Edema is when you have too much fluid in your body or under your skin. Edema may make your legs, feet, and ankles swell up. Swelling is also common in looser tissues, like around your eyes. This is a common condition. It gets more common as you get older. There are many possible causes of edema. Eating too much salt (sodium) and being on your feet or sitting for a long time can cause edema in your legs, feet, and ankles. Hot weather may make edema worse.  Edema is usually painless. Your skin may look swollen or shiny.  Follow these instructions at home:  · Keep the swollen body part raised (elevated) above the level of your heart when you are sitting or lying down.  · Do not sit still or stand for a long time.  · Do not wear tight clothes. Do not wear garters on your upper legs.  · Exercise your legs. This can help the swelling go down.  · Wear elastic bandages or  support stockings as told by your doctor.  · Eat a low-salt (low-sodium) diet to reduce fluid as told by your doctor.  · Depending on the cause of your swelling, you may need to limit how much fluid you drink (fluid restriction).  · Take over-the-counter and prescription medicines only as told by your doctor.  Contact a doctor if:  · Treatment is not working.  · You have heart, liver, or kidney disease and have symptoms of edema.  · You have sudden and unexplained weight gain.  Get help right away if:  · You have shortness of breath or chest pain.  · You cannot breathe when you lie down.  · You have pain, redness, or warmth in the swollen areas.  · You have heart, liver, or kidney disease and get edema all of a sudden.  · You have a fever and your symptoms get worse all of a sudden.  Summary  · Edema is when you have too much fluid in your body or under your skin.  · Edema may make your legs, feet, and ankles swell up. Swelling is also common in looser tissues, like around your eyes.  · Raise (elevate) the swollen body part above the level of your heart when you are sitting or lying down.  · Follow your doctor's instructions about diet and how much fluid you can drink (fluid restriction).  This information is not intended to replace advice given to you by your health care provider. Make sure you discuss any questions you have with your health care provider.  Document Released: 06/05/2009 Document Revised: 12/21/2018 Document Reviewed: 01/05/2018  [employer] Patient Education © 2020 [employer]

## 2021-05-06 NOTE — CARE PLAN
Problem: Communication  Goal: The ability to communicate needs accurately and effectively will improve  Outcome: PROGRESSING AS EXPECTED  Note: Pt is able to fully express and communicate needs appropriately. POC discussed and pt verbalized understanding.       Problem: Safety  Goal: Will remain free from injury  Outcome: PROGRESSING AS EXPECTED  Note: Pt has remained free from falls and all safety measures are put in place.       Problem: Venous Thromboembolism (VTW)/Deep Vein Thrombosis (DVT) Prevention:  Goal: Patient will participate in Venous Thrombosis (VTE)/Deep Vein Thrombosis (DVT)Prevention Measures  Outcome: PROGRESSING AS EXPECTED  Note: Pt is self motivated to continue to engage in ambulation and ROM exercises. Pt was educated on importance of DVT prophylaxis using both pharmacological and non-pharmacological methods. Pt was educated on Lovenox administration as a prophylactic DVT medication and is compliant with it's use.       Problem: Knowledge Deficit  Goal: Knowledge of disease process/condition, treatment plan, diagnostic tests, and medications will improve  Outcome: PROGRESSING AS EXPECTED  Note: Pt educated on admitting condition, diagnostic tests, and medications associated with POC.       Problem: Pain Management  Goal: Pain level will decrease to patient's comfort goal  Outcome: PROGRESSING AS EXPECTED  Note: Pt has had several mild headaches during current admission. Pain is able to be adequately controlled by Tylenol administration.

## 2021-05-06 NOTE — PROGRESS NOTES
Report received from NOLVIA Frederick. Plan of care discussed at patient's bedside. Whiteboard has been updated. Pt is resting comfortably in cardiac chair and declines any further needs at this time. Safety precautions in place and call light within reach.

## 2021-05-06 NOTE — PROGRESS NOTES
Pt discharged to home. Discharge instructions provided to pt. Pt verbalizes understanding. Pt states all questions have been answered. Signed copy in chart. Prescriptions sent to preferred pharmacy. Pt states that all personal belongings are in possession. Pt off unit via walking, escorted by staff.

## 2021-05-06 NOTE — PROGRESS NOTES
Assumed care of pt.  Received report from NOLVIA Slade.  Pt sitting edge of bed.  Safety precautions in place. Bed locked and in lowest position, call light within reach.  No needs at this time.

## 2021-05-07 NOTE — DISCHARGE SUMMARY
Discharge Summary    CHIEF COMPLAINT ON ADMISSION  Chief Complaint   Patient presents with   • Leg Swelling     BLE x 1.5 weeks   • Shortness of Breath     x 1.5 weeks   • Chest Pressure       Reason for Admission  shortness of breath, muscle pain, *     Admission Date  5/3/2021    CODE STATUS  FULL CODE    HPI & HOSPITAL COURSE    55-year-old male with a history of morbid obesity, preDM, hypertension, hypothyroidism, untreated obstructive sleep apnea who presented with shortness of breath and lower extremity swelling and weight gain.  CXR negative. COVID negative.  Echo normal.  Required 2-3 L NC on admission.  Started on IV lasix with good diuresis.  Weaned off oxygen, ambulated on day of discharge without desaturations.  Etiology likely untreated sleep apnea.  Ddimer negative.  Did have elevated LFTs which need to be monitored as outpatient.  RUQ u/s showed echogenic liver, most commonly hepatic steatosis and found a 2.9 simple cyst in the left hepatic lobe. On day of discharge he was on RA and switched to oral diuretics.  Pulm referral placed.  Stressed importance of wearing CPAP mask when sleeping.  Will need close PCP follow up.      Therefore, he is discharged in fair and stable condition to home with close outpatient follow-up.    The patient met 2-midnight criteria for an inpatient stay at the time of discharge.    Discharge Date  5/6/2021    FOLLOW UP ITEMS POST DISCHARGE  PCP follow up within 1 week, check labs including renal function and LFTs  Pulm referral    DISCHARGE DIAGNOSES  Principal Problem (Resolved):    Acute hypoxemic respiratory failure (HCC) POA: Yes  Active Problems:    Hypothyroidism (acquired) POA: Yes    Essential hypertension POA: Yes    Hyperglycemia POA: Yes    Bilateral lower extremity edema POA: Yes    Elevated liver enzymes POA: Yes    Obstructive sleep apnea POA: Yes    Prediabetes POA: Yes      FOLLOW UP  Future Appointments   Date Time Provider Department Center   6/23/2021  11:00 AM Theresa Lucas P.A.-C. [facility] None   6/23/2021  2:15 PM [facility] ECHO 1 [facility]QUETA Krause     Theresa Lucas P.A.-C.  [address]  [place] [address]  [phone]    Schedule an appointment as soon as possible for a visit        MEDICATIONS ON DISCHARGE     Medication List      START taking these medications      Instructions   furosemide 40 MG Tabs  Commonly known as: LASIX   Take 1 tablet by mouth every day.  Dose: 40 mg     potassium chloride SA 20 MEQ Tbcr  Commonly known as: Kdur   Take 1 tablet by mouth every evening.  Dose: 20 mEq        CHANGE how you take these medications      Instructions   losartan 100 MG Tabs  What changed: when to take this  Commonly known as: COZAAR   TAKE 1 TABLET BY MOUTH EVERY DAY        CONTINUE taking these medications      Instructions   acetaminophen 500 MG Tabs  Commonly known as: TYLENOL   Take 1,000 mg by mouth 2 times a day as needed for Mild Pain. 2 tablets = 1,000 mg.  Dose: 1,000 mg     ibuprofen 200 MG Tabs  Commonly known as: MOTRIN   Take 400 mg by mouth 2 times a day as needed for Mild Pain. 2 tablets = 400 mg.  Dose: 400 mg     levothyroxine 150 MCG Tabs  Commonly known as: SYNTHROID   Take 1 tablet by mouth every morning on an empty stomach.  Dose: 150 mcg     TURMERIC PO   Take 1 capsule by mouth every day.  Dose: 1 capsule            Allergies  No Known Allergies    DIET  No orders of the defined types were placed in this encounter.      ACTIVITY  As tolerated.  Weight bearing as tolerated    CONSULTATIONS  None    PROCEDURES  None    Discharge Exam:  Physical Exam   Constitutional: He is oriented to person, place, and time and well-developed, well-nourished, and in no distress.   obesity   HENT:   Head: Normocephalic and atraumatic.   Mouth/Throat: Oropharynx is clear and moist.   Eyes: Conjunctivae are normal. No scleral icterus.   Cardiovascular: Normal rate and regular rhythm. Exam reveals no gallop and no friction rub.   No  murmur heard.  Pulmonary/Chest: Effort normal and breath sounds normal.   Abdominal: Soft. Bowel sounds are normal. He exhibits no distension. There is no abdominal tenderness.   Musculoskeletal:         General: Edema (b/l LE pitting edema) present.      Cervical back: Normal range of motion.   Neurological: He is alert and oriented to person, place, and time. Gait normal.   Skin: Skin is warm. No rash noted.   Psychiatric: Mood and affect normal.         LABORATORY  Lab Results   Component Value Date    SODIUM 137 2021    POTASSIUM 4.0 2021    CHLORIDE 98 2021    CO2 30 2021    GLUCOSE 129 (H) 2021    BUN 19 2021    CREATININE 0.77 2021    CREATININE 1.1 2007      Recent Labs     21  1625 21  0511   ASTSGOT 66* 73*   ALTSGPT 71* 73*   TBILIRUBIN 0.7 0.7   GLOBULIN 3.2 3.4         Lab Results   Component Value Date    WBC 7.1 2021    HEMOGLOBIN 16.0 2021    HEMATOCRIT 47.0 2021    PLATELETCT 175 2021      EC-ECHOCARDIOGRAM COMPLETE W/ CONT  Transthoracic  Echo Report    Echocardiography Laboratory    CONCLUSIONS  Normal left ventricular systolic function.  Left ventricular ejection fraction is visually estimated to be 65%.  The right ventricle was normal in size and function.  No prior study is available for comparison.     IOANAKEYLASERAFIN PAPPAS  Exam Date:         2021                      07:24  Exam Location:     Inpatient  Priority:          Routine    Ordering Physician:        ROBERT BECERRIL  Referring Physician:  Sonographer:               Michelle Masters                              [facility]    Age:    55     Gender:    M  MRN:    [id number]  :    1965  BSA:    2.63   Ht (in):    71     Wt (lb):    336  Exam Type:     Complete, Contrast    Indications:     Shortness of breath, Edema, unspecified  ICD Codes:       R06.02  R60.9    CPT Codes:       41465,     BP:   154    /   87     HR:   75  Technical Quality:        Fair    MEASUREMENTS  (Male / Female) Normal Values  2D ECHO  LV Diastolic Diameter PLAX        4.7 cm                4.2 - 5.9 / 3.9 - 5.3   cm  LV Systolic Diameter PLAX         3.6 cm                2.1 - 4.0 cm  IVS Diastolic Thickness           1.2 cm                  LVPW Diastolic Thickness          1.2 cm                  LVOT Diameter                     2.2 cm                  Estimated LV Ejection Fraction    65 %                    LV Ejection Fraction MOD BP       66.9 %                >= 55  %  LV Ejection Fraction MOD 4C       58.9 %                  LV Ejection Fraction MOD 2C       72 %                      DOPPLER  AV Peak Velocity                  1.2 m/s                 AV Peak Gradient                  5.9 mmHg                AV Mean Gradient                  4 mmHg                  LVOT Peak Velocity                0.95 m/s                AV Area Cont Eq vti               2.8 cm2                 Mitral E Point Velocity           0.79 m/s                Mitral E to A Ratio               0.85                    MV Pressure Half Time             61.5 ms                 MV Area PHT                       3.6 cm2                 MV Deceleration Time              212 ms                  PV Peak Velocity                  0.93 m/s                PV Peak Gradient                  3.5 mmHg                RVOT Peak Velocity                0.64 m/s                MV E' Velocity                    5.8 cm/s                  * Indicates values subject to auto-interpretation  LV EF:  65    %    FINDINGS  Left Ventricle  Left ventricle is mildly dilated. Mild concentric left ventricular   hypertrophy. Normal left ventricular systolic function. Left   ventricular ejection fraction is visually estimated to be 65%. Normal   regional wall motion. Normal diastolic function. Contrast was used to   enhance visualization of the endocardial border. 2 mL of contrast was   administered. Existing IV was used. Located at  the right hand.    Right Ventricle  The right ventricle was normal in size and function.    Right Atrium  The right atrium is normal in size.  Inferior vena cava is not well   visualized.    Left Atrium  The left atrium is normal in size.  Left atrial volume index is 27   mL/sq m.    Mitral Valve  Structurally normal mitral valve without significant stenosis. Trace   mitral regurgitation.    Aortic Valve  The aortic valve is not well visualized. No stenosis or regurgitation   seen.    Tricuspid Valve  Structurally normal tricuspid valve without significant stenosis.   Unable to estimate pulmonary artery pressure due to an inadequate   tricuspid regurgitant jet.    Pulmonic Valve  The pulmonic valve is not well visualized. No stenosis or regurgitation   seen.    Pericardium  Normal pericardium without effusion.    Aorta  The aortic root is normal.  Ascending aorta diameter is 3.6 cm.    Donavan Andrade M.D.  (Electronically Signed)  Final Date:     04 May 2021 10:46        Total time of the discharge process exceeds 32 minutes.

## 2021-05-11 ENCOUNTER — OFFICE VISIT (OUTPATIENT)
Dept: MEDICAL GROUP | Facility: LAB | Age: 56
End: 2021-05-11
Payer: COMMERCIAL

## 2021-05-11 VITALS
OXYGEN SATURATION: 93 % | HEIGHT: 72 IN | WEIGHT: 315 LBS | TEMPERATURE: 96.4 F | DIASTOLIC BLOOD PRESSURE: 86 MMHG | BODY MASS INDEX: 42.66 KG/M2 | HEART RATE: 82 BPM | SYSTOLIC BLOOD PRESSURE: 124 MMHG | RESPIRATION RATE: 18 BRPM

## 2021-05-11 DIAGNOSIS — E03.9 HYPOTHYROIDISM (ACQUIRED): ICD-10-CM

## 2021-05-11 DIAGNOSIS — R60.0 BILATERAL LOWER EXTREMITY EDEMA: ICD-10-CM

## 2021-05-11 DIAGNOSIS — G47.33 OBSTRUCTIVE SLEEP APNEA: ICD-10-CM

## 2021-05-11 DIAGNOSIS — M62.838 MUSCLE SPASM: ICD-10-CM

## 2021-05-11 PROCEDURE — 99214 OFFICE O/P EST MOD 30 MIN: CPT | Performed by: PHYSICIAN ASSISTANT

## 2021-05-11 RX ORDER — POTASSIUM CHLORIDE 20 MEQ/1
20 TABLET, EXTENDED RELEASE ORAL EVERY EVENING
Qty: 30 TABLET | Refills: 2 | Status: SHIPPED | OUTPATIENT
Start: 2021-05-11 | End: 2021-06-04 | Stop reason: SDUPTHER

## 2021-05-11 RX ORDER — CYCLOBENZAPRINE HCL 5 MG
5 TABLET ORAL NIGHTLY PRN
Qty: 15 TABLET | Refills: 0 | Status: SHIPPED | OUTPATIENT
Start: 2021-05-11 | End: 2021-08-03

## 2021-05-11 RX ORDER — FUROSEMIDE 40 MG/1
40 TABLET ORAL DAILY
Qty: 30 TABLET | Refills: 2 | Status: SHIPPED | OUTPATIENT
Start: 2021-05-11 | End: 2021-06-04 | Stop reason: SDUPTHER

## 2021-05-11 ASSESSMENT — FIBROSIS 4 INDEX: FIB4 SCORE: 2.64

## 2021-05-11 NOTE — ASSESSMENT & PLAN NOTE
Follow up; pt reports that he is using Levothyroxine 150mcg PO BID (total of 300mcg/day).   Due to recheck labs in 2-3 weeks to see if TSH has returned to normal.   Continues to be concerned about weight and fatigue.

## 2021-05-11 NOTE — PROGRESS NOTES
Subjective:   Pietro Stauffer is a 55 y.o. male here today for hospital follow up    Obstructive sleep apnea  Follow up; pt has not been using CPAP machine for quite sometime.   Was recently admitted to hospital for acute respiratory failure which is though to be caused by chronic untreated CPAP.   Cardiac workup to include echo and BNP were WNL.   He has been started on Lasix and Potassium.     Pt is doing well today.   Has lost about 6lbs (likely in water) since d/c from hospital.     Hypothyroidism (acquired)  Follow up; pt reports that he is using Levothyroxine 150mcg PO BID (total of 300mcg/day).   Due to recheck labs in 2-3 weeks to see if TSH has returned to normal.   Continues to be concerned about weight and fatigue.        Current medicines (including changes today)  Current Outpatient Medications   Medication Sig Dispense Refill   • cyclobenzaprine (FLEXERIL) 5 mg tablet Take 1 tablet by mouth at bedtime as needed. 15 tablet 0   • potassium chloride SA (KDUR) 20 MEQ Tab CR Take 1 tablet by mouth every evening. 30 tablet 2   • furosemide (LASIX) 40 MG Tab Take 1 tablet by mouth every day. 30 tablet 2   • TURMERIC PO Take 1 capsule by mouth every day.     • acetaminophen (TYLENOL) 500 MG Tab Take 1,000 mg by mouth 2 times a day as needed for Mild Pain. 2 tablets = 1,000 mg.     • ibuprofen (MOTRIN) 200 MG Tab Take 400 mg by mouth 2 times a day as needed for Mild Pain. 2 tablets = 400 mg.     • levothyroxine (SYNTHROID) 150 MCG Tab Take 1 tablet by mouth every morning on an empty stomach. 90 tablet 1   • losartan (COZAAR) 100 MG Tab TAKE 1 TABLET BY MOUTH EVERY DAY (Patient taking differently: Take 100 mg by mouth every morning.) 90 tablet 0     No current facility-administered medications for this visit.     He  has a past medical history of Arthritis, Hypertension, Hypothyroidism, and Psychiatric disorder.    ROS  No chest pain, +SOB, no abdominal pain  +leg swelling, b/l       Objective:     /86 (BP  "Location: Left arm, Patient Position: Sitting, BP Cuff Size: Large adult)   Pulse 82   Temp (!) 35.8 °C (96.4 °F) (Temporal)   Resp 18   Ht 1.816 m (5' 11.5\")   Wt (!) 150 kg (330 lb 6.4 oz)   SpO2 93%  Body mass index is 45.44 kg/m².   Physical Exam:  Constitutional: Alert, no distress.  Skin: Warm, dry, good turgor, no rashes in visible areas.  Eye: Equal, round, conjunctiva clear, lids normal.  Neck: Trachea midline, no masses, no thyromegaly. No cervical or supraclavicular lymphadenopathy  Respiratory: Unlabored respiratory effort, lungs clear to auscultation, no wheezes, no ronchi.  Cardiovascular: Normal S1, S2, no murmur, +1 pitting edema, b/l  LUMBAR: No TTP along lumbar spinous processes or paraspinal muscles. Gait is normal.   Psych: Alert and oriented x3, normal affect and mood.        Assessment and Plan:   The following treatment plan was discussed    1. Muscle spasm  New concern, likely 2/2 being in hospital bed x3 days.  Pt does have Chiro - recommend that he follow up with them.   Can heat the low back region.   Rx as written for Flexeril to help ease spasms.   Pt was educated on use and SEs of medication.  Not to be used with any other sedating substance.  Pt agrees with the plan.   F/u prn for this.   - cyclobenzaprine (FLEXERIL) 5 mg tablet; Take 1 tablet by mouth at bedtime as needed.  Dispense: 15 tablet; Refill: 0    2. Hypothyroidism (acquired)  Follow up; due to recheck labs in 2-3 eeks.   - TSH; Future  - FREE THYROXINE; Future  - T3 FREE; Future    3. Bilateral lower extremity edema  Follow up  Doing well on current regimen  Refills as written   - potassium chloride SA (KDUR) 20 MEQ Tab CR; Take 1 tablet by mouth every evening.  Dispense: 30 tablet; Refill: 2  - furosemide (LASIX) 40 MG Tab; Take 1 tablet by mouth every day.  Dispense: 30 tablet; Refill: 2    4. Obstructive sleep apnea  Follow up; Strongly recommend that he f/u with Sleep medicine for fitting of CPAP machine. "   Referral information was printed for him today in clinic.   Continue to monitor symptoms  ED precautions if worsening SOB occurs again.       Followup: Return in about 4 weeks (around 6/8/2021), or if symptoms worsen or fail to improve.       Theresa Lucas P.A.-C.  Supervising MD: Dr. Salo Montana MD  05/11/21

## 2021-05-11 NOTE — ASSESSMENT & PLAN NOTE
Follow up; pt has not been using CPAP machine for quite sometime.   Was recently admitted to hospital for acute respiratory failure which is though to be caused by chronic untreated CPAP.   Cardiac workup to include echo and BNP were WNL.   He has been started on Lasix and Potassium.     Pt is doing well today.   Has lost about 6lbs (likely in water) since d/c from hospital.

## 2021-06-04 DIAGNOSIS — R60.0 BILATERAL LOWER EXTREMITY EDEMA: ICD-10-CM

## 2021-06-04 RX ORDER — POTASSIUM CHLORIDE 20 MEQ/1
20 TABLET, EXTENDED RELEASE ORAL EVERY EVENING
Qty: 30 TABLET | Refills: 2 | Status: SHIPPED | OUTPATIENT
Start: 2021-06-04 | End: 2021-08-26 | Stop reason: SDUPTHER

## 2021-06-04 RX ORDER — FUROSEMIDE 40 MG/1
40 TABLET ORAL DAILY
Qty: 30 TABLET | Refills: 2 | Status: SHIPPED | OUTPATIENT
Start: 2021-06-04 | End: 2021-08-26 | Stop reason: SDUPTHER

## 2021-06-04 NOTE — TELEPHONE ENCOUNTER
Received request via: Patient    Was the patient seen in the last year in this department? Yes  5/11/2021  Does the patient have an active prescription (recently filled or refills available) for medication(s) requested? No

## 2021-07-02 ENCOUNTER — TELEPHONE (OUTPATIENT)
Dept: MEDICAL GROUP | Facility: LAB | Age: 56
End: 2021-07-02

## 2021-07-02 NOTE — TELEPHONE ENCOUNTER
Patient called to inform Theresa of his symptoms, patient has b/l feet swelling. L shin is numb and R shin is painful. Patient is not able to come into clinic because he has [facility] and is waiting for a continuity of care form. 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 Patient also stated he will need his FMLA form updated. He will obtain a blank copy and provide it to our office ASAP. Informed Theresa of phone call.

## 2021-07-02 NOTE — TELEPHONE ENCOUNTER
In regards to the paperwork, Pietro called back stating he can just get a work note for another 2-3 weeks if that is ok. His last note has him off until 6/30/2021 we saw him last on 5/11/2021.

## 2021-07-02 NOTE — TELEPHONE ENCOUNTER
Intrusted him to go to the ED ASAP and not to wait if symptoms are acutely worsening.     Regarding the work note - unfortunately it has been about 1.5months since I have seen him ( I know this is related to insurance issues). Pt should obtain work note from ED. Can follow up in clinic for extension.       Theresa Lucas P.A.-C.

## 2021-08-03 ENCOUNTER — HOSPITAL ENCOUNTER (OUTPATIENT)
Dept: LAB | Facility: MEDICAL CENTER | Age: 56
End: 2021-08-03
Attending: PHYSICIAN ASSISTANT
Payer: COMMERCIAL

## 2021-08-03 ENCOUNTER — OFFICE VISIT (OUTPATIENT)
Dept: MEDICAL GROUP | Facility: LAB | Age: 56
End: 2021-08-03
Payer: COMMERCIAL

## 2021-08-03 VITALS
BODY MASS INDEX: 42.66 KG/M2 | HEART RATE: 99 BPM | HEIGHT: 72 IN | DIASTOLIC BLOOD PRESSURE: 76 MMHG | RESPIRATION RATE: 18 BRPM | WEIGHT: 315 LBS | SYSTOLIC BLOOD PRESSURE: 124 MMHG | OXYGEN SATURATION: 95 % | TEMPERATURE: 97.8 F

## 2021-08-03 DIAGNOSIS — R60.0 BILATERAL LOWER EXTREMITY EDEMA: ICD-10-CM

## 2021-08-03 DIAGNOSIS — I10 ESSENTIAL HYPERTENSION: ICD-10-CM

## 2021-08-03 DIAGNOSIS — E03.9 HYPOTHYROIDISM (ACQUIRED): ICD-10-CM

## 2021-08-03 LAB
T3FREE SERPL-MCNC: 2.62 PG/ML (ref 2–4.4)
T4 FREE SERPL-MCNC: 1.61 NG/DL (ref 0.93–1.7)
TSH SERPL DL<=0.005 MIU/L-ACNC: 0.28 UIU/ML (ref 0.38–5.33)

## 2021-08-03 PROCEDURE — 84443 ASSAY THYROID STIM HORMONE: CPT

## 2021-08-03 PROCEDURE — 99212 OFFICE O/P EST SF 10 MIN: CPT | Performed by: PHYSICIAN ASSISTANT

## 2021-08-03 PROCEDURE — 84439 ASSAY OF FREE THYROXINE: CPT

## 2021-08-03 PROCEDURE — 84481 FREE ASSAY (FT-3): CPT

## 2021-08-03 PROCEDURE — 36415 COLL VENOUS BLD VENIPUNCTURE: CPT

## 2021-08-03 RX ORDER — LEVOTHYROXINE SODIUM 300 UG/1
300 TABLET ORAL
COMMUNITY
Start: 2021-07-08 | End: 2021-08-26 | Stop reason: SDUPTHER

## 2021-08-03 RX ORDER — POTASSIUM CHLORIDE 1500 MG/1
TABLET, EXTENDED RELEASE ORAL
COMMUNITY
Start: 2021-07-08 | End: 2021-08-06

## 2021-08-03 ASSESSMENT — FIBROSIS 4 INDEX: FIB4 SCORE: 2.64

## 2021-08-03 NOTE — LETTER
August 3, 2021    To Whom It May Concern:         This is confirmation that Pietro Stauffer attended his scheduled appointment with Theresa Lucas P.A.-C. on 8/03/21. Please extend his time away from work through 08/31/2021. Pt may return 09/01/2021.         If you have any questions please do not hesitate to call me at the phone number listed below.    Sincerely,          Theresa Lucas P.A.-C.  [phone]

## 2021-08-05 DIAGNOSIS — I10 ESSENTIAL HYPERTENSION: ICD-10-CM

## 2021-08-05 RX ORDER — LOSARTAN POTASSIUM 100 MG/1
100 TABLET ORAL
Qty: 90 TABLET | Refills: 0 | Status: SHIPPED | OUTPATIENT
Start: 2021-08-05 | End: 2021-10-27

## 2021-08-05 RX ORDER — LOSARTAN POTASSIUM 100 MG/1
100 TABLET ORAL
Qty: 90 TABLET | Refills: 0 | OUTPATIENT
Start: 2021-08-05

## 2021-08-05 NOTE — TELEPHONE ENCOUNTER
Received request via: Patient    Was the patient seen in the last year in this department? Yes  8/3/2021  Does the patient have an active prescription (recently filled or refills available) for medication(s) requested? No

## 2021-08-06 NOTE — PROGRESS NOTES
"Subjective:   Pietro Stauffer is a 55 y.o. male here today for follow-up on swelling, renewal of FMLA paperwork.    Bilateral lower extremity edema  Follow-up, patient continues to have bilateral edema, though improving.  He is using the Lasix 40 mg daily as well as a potassium supplement.    Continues to have intermittent shortness of breath as well.  Was seen at [facility] in between our last appointment and today.     Denies chest pain or shortness of breath.  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         Essential hypertension  Follow-up, chronic condition  BP is well controlled  Requesting refills today       Current medicines (including changes today)  Current Outpatient Medications   Medication Sig Dispense Refill   • losartan (COZAAR) 100 MG Tab Take 1 tablet by mouth every day. 90 tablet 0   • levothyroxine (SYNTHROID) 300 MCG Tab Take 300 mcg by mouth every day.     • potassium chloride SA (KDUR) 20 MEQ Tab CR Take 1 tablet by mouth every evening. 30 tablet 2   • furosemide (LASIX) 40 MG Tab Take 1 tablet by mouth every day. 30 tablet 2   • TURMERIC PO Take 1 capsule by mouth every day.     • acetaminophen (TYLENOL) 500 MG Tab Take 1,000 mg by mouth 2 times a day as needed for Mild Pain. 2 tablets = 1,000 mg.     • ibuprofen (MOTRIN) 200 MG Tab Take 400 mg by mouth 2 times a day as needed for Mild Pain. 2 tablets = 400 mg.       No current facility-administered medications for this visit.     He  has a past medical history of Arthritis, Hypertension, Hypothyroidism, and Psychiatric disorder.    ROS   As per HPI  No chest pain, no shortness of breath, no abdominal pain       Objective:     /76 (BP Location: Left arm, Patient Position: Sitting, BP Cuff Size: Large adult)   Pulse 99   Temp 36.6 °C (97.8 °F) (Temporal)   Resp 18   Ht 1.816 m (5' 11.5\")   Wt (!) 148 kg (325 lb 12.8 oz)   SpO2 95%  Body mass index is 44.81 kg/m².   Physical " Exam:  Constitutional: Alert, no distress.  Skin: Warm, dry, good turgor, no rashes in visible areas.  Eye: Equal, round, conjunctiva clear, lids normal.  Neck: Trachea midline,  Respiratory: Unlabored respiratory effort, lungs clear to auscultation, no wheezes, no ronchi.  Cardiovascular: Normal S1, S2, no murmur, +1 pitting edema, b/l.   Psych: Alert and oriented x3, normal affect and mood.        Assessment and Plan:   The following treatment plan was discussed    1. Bilateral lower extremity edema  Ongoing concern, stable at this time.  Continue the Lasix as well as potassium supplementation  Patient was provided with a note for work today    2. HTN:  BP is well controlled on current regimen  Continue without changes      Followup: Return if symptoms worsen or fail to improve.       Theresa Lucas P.A.-C.  Supervising MD: Dr. Salo Montana MD  08/06/21

## 2021-08-06 NOTE — ASSESSMENT & PLAN NOTE
Follow-up, patient continues to have bilateral edema, though improving.  He is using the Lasix 40 mg daily as well as a potassium supplement.    Continues to have intermittent shortness of breath as well.  Was seen at [facility] in between our last appointment and today.     Denies chest pain or shortness of breath.  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

## 2021-08-26 ENCOUNTER — HOSPITAL ENCOUNTER (OUTPATIENT)
Dept: LAB | Facility: MEDICAL CENTER | Age: 56
End: 2021-08-26
Attending: PHYSICIAN ASSISTANT
Payer: COMMERCIAL

## 2021-08-26 ENCOUNTER — OFFICE VISIT (OUTPATIENT)
Dept: MEDICAL GROUP | Facility: LAB | Age: 56
End: 2021-08-26
Payer: COMMERCIAL

## 2021-08-26 VITALS
WEIGHT: 315 LBS | BODY MASS INDEX: 42.66 KG/M2 | DIASTOLIC BLOOD PRESSURE: 66 MMHG | RESPIRATION RATE: 18 BRPM | OXYGEN SATURATION: 97 % | TEMPERATURE: 96.9 F | HEIGHT: 72 IN | SYSTOLIC BLOOD PRESSURE: 110 MMHG | HEART RATE: 104 BPM

## 2021-08-26 DIAGNOSIS — Z11.52 ENCOUNTER FOR SCREENING LABORATORY TESTING FOR COVID-19 VIRUS: ICD-10-CM

## 2021-08-26 DIAGNOSIS — R60.0 BILATERAL LOWER EXTREMITY EDEMA: ICD-10-CM

## 2021-08-26 DIAGNOSIS — E03.9 HYPOTHYROIDISM (ACQUIRED): ICD-10-CM

## 2021-08-26 DIAGNOSIS — E66.01 MORBID OBESITY WITH BMI OF 45.0-49.9, ADULT (HCC): ICD-10-CM

## 2021-08-26 LAB — SARS-COV-2 AB SERPL QL IA: NORMAL

## 2021-08-26 PROCEDURE — 99214 OFFICE O/P EST MOD 30 MIN: CPT | Performed by: PHYSICIAN ASSISTANT

## 2021-08-26 PROCEDURE — 86769 SARS-COV-2 COVID-19 ANTIBODY: CPT

## 2021-08-26 PROCEDURE — 36415 COLL VENOUS BLD VENIPUNCTURE: CPT

## 2021-08-26 RX ORDER — PHENTERMINE HYDROCHLORIDE 15 MG/1
15 CAPSULE ORAL EVERY MORNING
Qty: 30 CAPSULE | Refills: 0 | Status: SHIPPED | OUTPATIENT
Start: 2021-08-26 | End: 2021-09-02 | Stop reason: SDUPTHER

## 2021-08-26 RX ORDER — FUROSEMIDE 40 MG/1
40 TABLET ORAL 2 TIMES DAILY
Qty: 60 TABLET | Refills: 1 | Status: SHIPPED | OUTPATIENT
Start: 2021-08-26 | End: 2021-11-10 | Stop reason: SDUPTHER

## 2021-08-26 RX ORDER — POTASSIUM CHLORIDE 20 MEQ/1
20 TABLET, EXTENDED RELEASE ORAL 2 TIMES DAILY
Qty: 60 TABLET | Refills: 1
Start: 2021-08-26 | End: 2021-09-25

## 2021-08-26 RX ORDER — LEVOTHYROXINE SODIUM 300 UG/1
300 TABLET ORAL
Qty: 30 TABLET | Refills: 3 | Status: SHIPPED | OUTPATIENT
Start: 2021-08-26 | End: 2021-09-27 | Stop reason: SDUPTHER

## 2021-08-26 ASSESSMENT — FIBROSIS 4 INDEX: FIB4 SCORE: 2.64

## 2021-08-26 NOTE — ASSESSMENT & PLAN NOTE
Follow up. TSH is slightly low, though Free T4 is normal.   Will continue thyroid medication at current dose.   No changes.

## 2021-08-26 NOTE — ASSESSMENT & PLAN NOTE
Weight has been increasing, despite efforts to change his diet.   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   Has not used medication for weight loss in the past.   No chest pain. Mild SOB (previously worked up).   No palpitations.

## 2021-08-26 NOTE — LETTER
[facility]  Theresa Lucas P.A.-C.  [address]  [place] [address]  Fax: [phone]   Authorization for Release/Disclosure of   Protected Health Information   Name: PIETRO STAUFFER : 1965 SSN: [id number]   Address: [address] [place] [address]  [place] [address] Phone:    [phone] (home)    I authorize the entity listed below to release/disclose the PHI below to:   [facility]/Theresa Lucas P.A.-C. and Theresa Lucas P.A.-C.   Provider or Entity Name:  [facility]/sleep medicine   Address   City, [facility]   Phone:      Fax:     Reason for request: continuity of care   Information to be released:    [  ] LAST COLONOSCOPY,  including any PATH REPORT and follow-up  [  ] LAST FIT/COLOGUARD RESULT [  ] LAST DEXA  [  ] LAST MAMMOGRAM  [  ] LAST PAP  [  ] LAST LABS [  ] RETINA EXAM REPORT  [  ] IMMUNIZATION RECORDS  [ xxx  ] Release all info      [  ] Check here and initial the line next to each item to release ALL health information INCLUDING  _____ Care and treatment for drug and / or alcohol abuse  _____ HIV testing, infection status, or AIDS  _____ Genetic Testing    DATES OF SERVICE OR TIME PERIOD TO BE DISCLOSED: _____________  I understand and acknowledge that:  * This Authorization may be revoked at any time by you in writing, except if your health information has already been used or disclosed.  * Your health information that will be used or disclosed as a result of you signing this authorization could be re-disclosed by the recipient. If this occurs, your re-disclosed health information may no longer be protected by State or Federal laws.  * You may refuse to sign this Authorization. Your refusal will not affect your ability to obtain treatment.  * This Authorization becomes effective upon signing and will  on (date) __________.      If no date is indicated, this Authorization will  one (1) year from the signature date.    Name: Pietro Stauffer    Signature:    Date:     8/26/2021       PLEASE FAX REQUESTED RECORDS BACK TO: [phone]

## 2021-08-26 NOTE — PROGRESS NOTES
Subjective:   Pietro Stauffer is a 55 y.o. male here today for medication refills; discuss weight management.   Bilateral lower extremity edema  Chronic; follow up  Using Lasix. Typically uses 40mg PO once daily. May use a second dose if legs have worsened swelling.   Doing well.   No chest pain or sob        Hypothyroidism (acquired)  Follow up. TSH is slightly low, though Free T4 is normal.   Will continue thyroid medication at current dose.   No changes.       Morbid obesity with BMI of 45.0-49.9, adult (HCC)  Weight has been increasing, despite efforts to change his diet.   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   Has not used medication for weight loss in the past.   No chest pain. Mild SOB (previously worked up).   No palpitations.        Current medicines (including changes today)  Current Outpatient Medications   Medication Sig Dispense Refill   • furosemide (LASIX) 40 MG Tab Take 1 Tablet by mouth 2 times a day. 60 Tablet 1   • potassium chloride SA (KDUR) 20 MEQ Tab CR Take 1 Tablet by mouth 2 times a day for 30 days. 60 Tablet 1   • levothyroxine (SYNTHROID) 300 MCG Tab Take 1 Tablet by mouth every day. 30 Tablet 3   • phentermine 15 MG capsule Take 1 Capsule by mouth every morning for 30 days. 30 Capsule 0   • losartan (COZAAR) 100 MG Tab Take 1 tablet by mouth every day. 90 tablet 0   • TURMERIC PO Take 1 capsule by mouth every day.     • acetaminophen (TYLENOL) 500 MG Tab Take 1,000 mg by mouth 2 times a day as needed for Mild Pain. 2 tablets = 1,000 mg.     • ibuprofen (MOTRIN) 200 MG Tab Take 400 mg by mouth 2 times a day as needed for Mild Pain. 2 tablets = 400 mg.       No current facility-administered medications for this visit.     He  has a past medical history of Arthritis, Hypertension, Hypothyroidism, and Psychiatric disorder.    ROS   No chest pain, no shortness of breath, no abdominal pain       Objective:     /66 (BP Location: Left arm, Patient  "Position: Sitting, BP Cuff Size: Large adult)   Pulse (!) 104   Temp 36.1 °C (96.9 °F) (Temporal)   Resp 18   Ht 1.816 m (5' 11.5\")   Wt (!) 150 kg (331 lb 1.6 oz)   SpO2 97%  Body mass index is 45.54 kg/m².   Physical Exam:  Constitutional: Alert, no distress.  Skin: Warm, dry, good turgor, no rashes in visible areas.  Eye: Equal, round, conjunctiva clear, lids normal.  Neck: Trachea midline,   Respiratory: Unlabored respiratory effort, lungs clear to auscultation, no wheezes, no ronchi.  Cardiovascular: Normal S1, S2, no murmur, Non-pitting edema, b/l.  Psych: Alert and oriented x3, normal affect and mood.    Assessment and Plan:   The following treatment plan was discussed    1. Bilateral lower extremity edema  Follow up; chronic condition.   Rx for Lasix and potassium supplements.   Symptoms are well controlled on current regimen.   Refills as written.   - furosemide (LASIX) 40 MG Tab; Take 1 Tablet by mouth 2 times a day.  Dispense: 60 Tablet; Refill: 1  - potassium chloride SA (KDUR) 20 MEQ Tab CR; Take 1 Tablet by mouth 2 times a day for 30 days.  Dispense: 60 Tablet; Refill: 1    2. Hypothyroidism (acquired)  Follow up; TSH is slightly low - however, Free T4 is WNL.   Rx refill as written. No changes.   - levothyroxine (SYNTHROID) 300 MCG Tab; Take 1 Tablet by mouth every day.  Dispense: 30 Tablet; Refill: 3    3. Encounter for screening laboratory testing for COVID-19 virus  - SARS CoV-2 Ab, Total; Future    4. Morbid obesity with BMI of 45.0-49.9, adult (HCC)  BMI was assessed today and reviewed with patient as meeting criteria for the risk factor obesity.  Willing to change as well as barriers were assessed. A collaborative plan was made with the patient and goals were set. Assistance was discussed, including self-help and more formal medical adjunctive treatments.   Wt Readings from Last 3 Encounters:   08/26/21 (!) 150 kg (331 lb 1.6 oz)   08/03/21 (!) 148 kg (325 lb 12.8 oz)   05/11/21 (!) 150 kg " (330 lb 6.4 oz)     Phentermine 15mg PO once daily as written.   Pt was educated on use and SEs of medication; no chest pain, palpitations.   Will F/u with the patient in 3 weeks.   Pt agrees with the plan.     Followup: Return in about 3 weeks (around 9/16/2021).       Theresa Lucas P.A.-C.  Supervising MD: Dr. Salo Montana MD  08/26/21

## 2021-08-26 NOTE — ASSESSMENT & PLAN NOTE
Chronic; follow up  Using Lasix. Typically uses 40mg PO once daily. May use a second dose if legs have worsened swelling.   Doing well.   No chest pain or sob

## 2021-09-02 DIAGNOSIS — E66.01 MORBID OBESITY WITH BMI OF 45.0-49.9, ADULT (HCC): ICD-10-CM

## 2021-09-02 RX ORDER — PHENTERMINE HYDROCHLORIDE 15 MG/1
15 CAPSULE ORAL EVERY MORNING
Qty: 30 CAPSULE | Refills: 0 | Status: SHIPPED | OUTPATIENT
Start: 2021-09-02 | End: 2021-09-14 | Stop reason: SDUPTHER

## 2021-09-14 ENCOUNTER — TELEPHONE (OUTPATIENT)
Dept: MEDICAL GROUP | Facility: LAB | Age: 56
End: 2021-09-14

## 2021-09-14 DIAGNOSIS — E66.01 MORBID OBESITY WITH BMI OF 45.0-49.9, ADULT (HCC): ICD-10-CM

## 2021-09-14 RX ORDER — PHENTERMINE HYDROCHLORIDE 15 MG/1
15 CAPSULE ORAL EVERY MORNING
Qty: 30 CAPSULE | Refills: 0 | Status: SHIPPED | OUTPATIENT
Start: 2021-09-14 | End: 2021-09-27 | Stop reason: SDUPTHER

## 2021-09-14 NOTE — PROGRESS NOTES
PDMP has been reviewed.   Pt has not picked up Phentermine.   1. Morbid obesity with BMI of 45.0-49.9, adult (HCC)  phentermine 15 MG capsule       **Could we also follow up on his new [facility] paperwork.   I will be returning to the clinic 09/27/2021. Will be out starting tomorrow.   Theresa Lucas P.A.-C.

## 2021-09-14 NOTE — TELEPHONE ENCOUNTER
----- Message from Vonda Rodriguez sent at 9/14/2021  2:15 PM PDT -----  Regarding: letter for leave of absents  Patient called requesting prescription for phentermine 15 MG capsule be resent to the [facility] on [facility], and he is also requesting an update on leave of absent paper work.  He said he needed to talk to you please.BK

## 2021-09-27 ENCOUNTER — OFFICE VISIT (OUTPATIENT)
Dept: MEDICAL GROUP | Facility: LAB | Age: 56
End: 2021-09-27
Payer: OTHER MISCELLANEOUS

## 2021-09-27 VITALS
HEIGHT: 72 IN | HEART RATE: 89 BPM | SYSTOLIC BLOOD PRESSURE: 124 MMHG | WEIGHT: 315 LBS | OXYGEN SATURATION: 93 % | DIASTOLIC BLOOD PRESSURE: 76 MMHG | TEMPERATURE: 97.5 F | BODY MASS INDEX: 42.66 KG/M2 | RESPIRATION RATE: 12 BRPM

## 2021-09-27 DIAGNOSIS — R06.02 SOB (SHORTNESS OF BREATH): ICD-10-CM

## 2021-09-27 DIAGNOSIS — E66.01 MORBID OBESITY WITH BMI OF 45.0-49.9, ADULT (HCC): ICD-10-CM

## 2021-09-27 DIAGNOSIS — R60.0 BILATERAL LOWER EXTREMITY EDEMA: ICD-10-CM

## 2021-09-27 DIAGNOSIS — G47.33 OBSTRUCTIVE SLEEP APNEA: ICD-10-CM

## 2021-09-27 DIAGNOSIS — E03.9 HYPOTHYROIDISM (ACQUIRED): ICD-10-CM

## 2021-09-27 PROCEDURE — 99214 OFFICE O/P EST MOD 30 MIN: CPT | Performed by: FAMILY MEDICINE

## 2021-09-27 RX ORDER — LEVOTHYROXINE SODIUM 300 UG/1
300 TABLET ORAL
Qty: 90 TABLET | Refills: 3 | Status: SHIPPED | OUTPATIENT
Start: 2021-09-27 | End: 2022-10-05

## 2021-09-27 RX ORDER — POTASSIUM CHLORIDE 20 MEQ/1
20 TABLET, EXTENDED RELEASE ORAL 2 TIMES DAILY
COMMUNITY

## 2021-09-27 RX ORDER — PHENTERMINE HYDROCHLORIDE 15 MG/1
15 CAPSULE ORAL EVERY MORNING
Qty: 30 CAPSULE | Refills: 0 | Status: SHIPPED | OUTPATIENT
Start: 2021-09-27 | End: 2021-11-10 | Stop reason: SDUPTHER

## 2021-09-27 ASSESSMENT — FIBROSIS 4 INDEX: FIB4 SCORE: 2.64

## 2021-09-27 NOTE — LETTER
September 27, 2021        Pietro Stauffer is unable to perform essential job functions of lifting more than 15 lbs at a time, spending more than 1 hour on feet at a time. Should also avoid repeated pushing, pulling, carrying.   Unable to wear appropriate footwear due to ongoing swelling of feet.                         Jocelyn Maynard M.D.

## 2021-09-27 NOTE — PROGRESS NOTES
"Subjective:     Chief Complaint   Patient presents with   • Follow-Up         HPI:   Pietro presents today for follow up. Was admitted back in May for concerns regarding possible heart failure and lower leg swelling. Was having some SOB and dyspnea at that time as well.   He was prescribed medication to aid in weight loss recently with his PCP. Struggles with weight. Only doing some drumming for exercise.     Also needs a note stating what job he can and cant do.   Trying to resign his current job for another one. They want paperwork stating he cant do the physical parts of his job.     Diet: \"minimal\". Whole foods, protein, and veggies mostly. Does Cheat at times, had milk shake last night.   Good protein, omelette for breakfast usually.     Still struggling with lower extremity swelling. Sometimes takes lasix twice daily, couple times a week, otherwise once daily.   Certain movements, leaning forward makes it hard to breathe, lifting things is hard.   Cramping in legs has reduced quite a bit. Walking more.     Started back on CPAP machine 3 weeks ago. Using full face mask, better fit lately.       Current Outpatient Medications Ordered in Epic   Medication Sig Dispense Refill   • phentermine 15 MG capsule Take 1 Capsule by mouth every morning for 30 days. 30 Capsule 0   • furosemide (LASIX) 40 MG Tab Take 1 Tablet by mouth 2 times a day. 60 Tablet 1   • levothyroxine (SYNTHROID) 300 MCG Tab Take 1 Tablet by mouth every day. 30 Tablet 3   • losartan (COZAAR) 100 MG Tab Take 1 tablet by mouth every day. 90 tablet 0   • TURMERIC PO Take 1 capsule by mouth every day.     • acetaminophen (TYLENOL) 500 MG Tab Take 1,000 mg by mouth 2 times a day as needed for Mild Pain. 2 tablets = 1,000 mg.     • ibuprofen (MOTRIN) 200 MG Tab Take 400 mg by mouth 2 times a day as needed for Mild Pain. 2 tablets = 400 mg.       No current [facility]-ordered facility-administered medications on file.           ROS:  Gen: no fevers/chills, no " "changes in weight  Eyes: no changes in vision  ENT: no sore throat, no hearing loss, no bloody nose  Pulm: no sob, no cough  CV: no chest pain, no palpitations  GI: no nausea/vomiting, no diarrhea  : no dysuria  MSk: no myalgias  Skin: no rash      Objective:     Exam:  /76 (BP Location: Right arm, Patient Position: Sitting, BP Cuff Size: Adult long)   Pulse 89   Temp 36.4 °C (97.5 °F)   Resp 12   Ht 1.816 m (5' 11.5\")   Wt (!) 149 kg (328 lb)   SpO2 93%   BMI 45.11 kg/m²  Body mass index is 45.11 kg/m².    Gen: AAOx3, NAD, well appearing  HEENT: NCAT, EOMI, Nares patent, Mucosa moist  Resp: Normal chest wall rise and fall, not SOB, no tachypnea  Skin: no rash or abnormality of visible skin.   Psych: normal speech, not slurred, good insight, affect full  MSK: Moves all four limbs equally and normally, gait normal  Ext: edema from mid tibia distally. pitting        Assessment & Plan:     55 y.o. male with the following -     1. Morbid obesity with BMI of 45.0-49.9, adult (HCC)  Discussed use of phentermine.  He is never picked up the first prescription due to some pharmacy mixup.  Discussed also continuing to work on diet and exercise as well.  Discussed low-carb diet, focusing on whole real foods that came from nature and less processed things.  We did discuss potatoes and starches in general and that he should cut out as well.  - phentermine 15 MG capsule; Take 1 Capsule by mouth every morning for 30 days.  Dispense: 30 Capsule; Refill: 0    2. Obstructive sleep apnea  Continues to complain of fatigue and lack of energy.  He is only been back on his CPAP for about 3 weeks now so this may continue to improve with time.  Also some weight loss would help with this as well.    3. Bilateral lower extremity edema  Note for work today stating that he is unable to perform specific functions for his job.    4. SOB (shortness of breath)  We will go ahead and try to get some pulmonary function test so that " pulmonary has this when he sees them in November.  Continues to complain of shortness of breath and some dyspnea of which the source at this time is unknown other than possible hypoventilation of obesity syndrome.  Likely with his sleep apnea he probably has some pulmonary hypertension as well.  - PULMONARY FUNCTION TESTS -Test requested: Complete Pulmonary Function Test; Future          No follow-ups on file.    Please note that this dictation was created using voice recognition software. I have made every reasonable attempt to correct obvious errors, but I expect that there are errors of grammar and possibly content that I did not discover before finalizing the note.

## 2021-10-27 DIAGNOSIS — I10 ESSENTIAL HYPERTENSION: ICD-10-CM

## 2021-10-27 RX ORDER — LOSARTAN POTASSIUM 100 MG/1
100 TABLET ORAL
Qty: 90 TABLET | Refills: 0 | Status: SHIPPED | OUTPATIENT
Start: 2021-10-27

## 2021-10-27 NOTE — TELEPHONE ENCOUNTER
Received request via: Pharmacy    Was the patient seen in the last year in this department? Yes  9/27/21  Does the patient have an active prescription (recently filled or refills available) for medication(s) requested? No

## 2021-11-10 DIAGNOSIS — R60.0 BILATERAL LOWER EXTREMITY EDEMA: ICD-10-CM

## 2021-11-10 DIAGNOSIS — E66.01 MORBID OBESITY WITH BMI OF 45.0-49.9, ADULT (HCC): ICD-10-CM

## 2021-11-11 RX ORDER — FUROSEMIDE 40 MG/1
40 TABLET ORAL 2 TIMES DAILY
Qty: 60 TABLET | Refills: 1 | Status: SHIPPED | OUTPATIENT
Start: 2021-11-11

## 2021-11-11 RX ORDER — PHENTERMINE HYDROCHLORIDE 15 MG/1
15 CAPSULE ORAL EVERY MORNING
Qty: 30 CAPSULE | Refills: 0 | Status: SHIPPED | OUTPATIENT
Start: 2021-11-11 | End: 2021-12-11

## 2022-04-07 ENCOUNTER — TELEPHONE (OUTPATIENT)
Dept: MEDICAL GROUP | Facility: LAB | Age: 57
End: 2022-04-07
Payer: OTHER MISCELLANEOUS

## 2022-04-07 NOTE — TELEPHONE ENCOUNTER
Patient called and LVM stating he had a few questions but did not specify what they were. He did also inform us that he has moved to [place] and asked for suggestions for a primary there. I called him back and LVM that we do not have any specific recommendations in [place].

## 2022-04-11 NOTE — TELEPHONE ENCOUNTER
Correct - I do not know anyone in [place].   If you can get more information regarding his questions I would be happy to help.     Let me know if you have any questions or concerns.   Theresa Lucas P.A.-C.

## 2022-10-04 DIAGNOSIS — E03.9 HYPOTHYROIDISM (ACQUIRED): ICD-10-CM

## 2022-10-05 RX ORDER — LEVOTHYROXINE SODIUM 300 UG/1
300 TABLET ORAL
Qty: 30 TABLET | Refills: 23 | Status: SHIPPED | OUTPATIENT
Start: 2022-10-05